# Patient Record
Sex: FEMALE | Race: WHITE | NOT HISPANIC OR LATINO | Employment: OTHER | URBAN - METROPOLITAN AREA
[De-identification: names, ages, dates, MRNs, and addresses within clinical notes are randomized per-mention and may not be internally consistent; named-entity substitution may affect disease eponyms.]

---

## 2017-03-21 ENCOUNTER — THERAPY VISIT (OUTPATIENT)
Dept: PHYSICAL THERAPY | Facility: CLINIC | Age: 77
End: 2017-03-21
Payer: MEDICARE

## 2017-03-21 DIAGNOSIS — M25.511 RIGHT SHOULDER PAIN, UNSPECIFIED CHRONICITY: Primary | ICD-10-CM

## 2017-03-21 PROCEDURE — 97161 PT EVAL LOW COMPLEX 20 MIN: CPT | Mod: GP | Performed by: PHYSICAL THERAPIST

## 2017-03-21 PROCEDURE — 97110 THERAPEUTIC EXERCISES: CPT | Mod: GP | Performed by: PHYSICAL THERAPIST

## 2017-03-21 PROCEDURE — G8984 CARRY CURRENT STATUS: HCPCS | Mod: GP | Performed by: PHYSICAL THERAPIST

## 2017-03-21 PROCEDURE — G8985 CARRY GOAL STATUS: HCPCS | Mod: GP | Performed by: PHYSICAL THERAPIST

## 2017-03-21 NOTE — MR AVS SNAPSHOT
"              After Visit Summary   3/21/2017    Kaycee Fuchs    MRN: [id number]           Patient Information     Date Of Birth          1940        Visit Information        Provider Department      3/21/2017 12:30 PM Trev Muñoz PT [place] [facility]        Today's Diagnoses     Right shoulder pain, unspecified chronicity    -  1       Follow-ups after your visit        Your next 10 appointments already scheduled     Mar 29, 2017 10:10 AM CDT   JENNIFER Extremity with Trev Muñoz PT   [place] [facility] ([place])    [address] [facility] [address]   [phone]              Who to contact     If you have questions or need follow up information about today's clinic visit or your schedule please contact [facility] directly at [phone].  Normal or non-critical lab and imaging results will be communicated to you by MyChart, letter or phone within 4 business days after the clinic has received the results. If you do not hear from us within 7 days, please contact the clinic through MyChart or phone. If you have a critical or abnormal lab result, we will notify you by phone as soon as possible.  Submit refill requests through [employer] or call your pharmacy and they will forward the refill request to us. Please allow 3 business days for your refill to be completed.          Additional Information About Your Visit        MyChart Information     [employer] lets you send messages to your doctor, view your test results, renew your prescriptions, schedule appointments and more. To sign up, go to [email or web address] . Click on \"Log in\" on the left side of the screen, which will take you to the Welcome page. Then click on \"Sign up Now\" on the right side of the page.     You will be asked to enter the access code listed below, as well as some personal information. Please follow the directions to create your username and " password.     Your access code is: [email or web address]  Expires: 2017  1:38 PM     Your access code will  in 90 days. If you need help or a new code, please call your [place] clinic or [phone].        Care EveryWhere ID     This is your Care EveryWhere ID. This could be used by other organizations to access your [place] medical records  [id number]         Blood Pressure from Last 3 Encounters:   06/06/10 130/70    Weight from Last 3 Encounters:   06/06/10 85.3 kg (188 lb)              We Performed the Following     HC PT EVAL, LOW COMPLEXITY     JENNIFER CERT REPORT     THERAPEUTIC EXERCISES        Primary Care Provider Office Phone # Fax #    Natalya NIKOLAY Matthews [phone]       [facility] [address] [facility] [address]  [facility] [address]        Thank you!     Thank you for choosing [place] [facility]  [facility]. Our goal is always to provide you with excellent care. Hearing back from our patients is one way we can continue to improve our services. Please take a few minutes to complete the written survey that you may receive in the mail after your visit with us. Thank you!             Your Updated Medication List - Protect others around you: Learn how to safely use, store and throw away your medicines at www.disposemymeds.org.          This list is accurate as of: 3/21/17  1:38 PM.  Always use your most recent med list.                   Brand Name Dispense Instructions for use    acetaminophen-isometheptene-dichloralphenazone 325- MG Caps     30 Cap    ONE TO TWO CAPSULES EVERY 4 HOURS AS NEEDED       ADVAIR DISKUS 100-50 MCG/DOSE diskus inhaler   Generic drug:  fluticasone-salmeterol      1 INHALATION EVERY 12 HOURS       FLONASE 50 MCG/ACT spray   Generic drug:  fluticasone      None Entered       naproxen 500 MG tablet    NAPROSYN    40 Tab    ONE TABLET TWICE DAILY WITH FOOD       OMEPRAZOLE      None Entered       ZYRTEC ALLERGY PO      None  Entered

## 2017-03-21 NOTE — PROGRESS NOTES
Subjective:    Kaycee Fuchs is a 76 year old female with a right shoulder condition.  Condition occurred with:  Unknown cause (R shoulder pain started Nov 2016 unknown injury, pt reports pain has progressed (in [place] 2 months pain was getting worse - used stackable washer/dryer was painful now back home and could not put luggage away due to pain. R shoulder injection last friday.).  Condition occurred: for unknown reasons.  This is a new condition  Nov 2016  .    Patient reports pain:  Posterior.    Pain is described as aching and is intermittent and reported as 5/10.  Associated symptoms:  Loss of motion/stiffness. Pain is worse during the night and worse during the day.  Symptoms are exacerbated by certain positions, using arm overhead, using arm at shoulder level, lifting and lying on extremity and relieved by ice.  Since onset symptoms are unchanged.  Special tests:  MRI.  Previous treatment: shoulder injection.  There was mild and no improvement following previous treatment.  General health as reported by patient is good.  Pertinent medical history includes:  Osteoarthritis, heart problems and asthma.  Medical allergies: no.  Other surgeries include:  Orthopedic surgery (2 COOPER, 1 TKA).  Current medications:  Heparin/coumadin.  Current occupation is Retired  .        Barriers include:  None as reported by the patient.    Red flags:  None as reported by the patient.                      Objective:    Standing Alignment:      Shoulder/UE:  Rounded shoulders                                       Shoulder Evaluation:  ROM:  AROM:    Flexion:  Left:  160    Right:  90    Abduction:  Left: 160   Right:  90      External Rotation:  Left:  70    Right:  70                PROM:  normal                                Strength:    Flexion: Right: 3/5     Pain:     Abduction:  Right: 3/5     Pain:    Internal Rotation:  Left:5-/5     Pain:    Right: 5-/5     Pain:  External Rotation:   Left:5-/5     Pain:   Right:4-/5       Pain:+                                                     General     ROS    Assessment/Plan:      Patient is a 76 year old female with right side shoulder complaints.    Patient has the following significant findings with corresponding treatment plan.                Diagnosis 1:  R shoulder pain  Pain -  hot/cold therapy, self management, education and home program  Decreased ROM/flexibility - manual therapy and therapeutic exercise  Decreased strength - therapeutic exercise and therapeutic activities  Inflammation - cold therapy  Decreased function - therapeutic activities  Impaired posture - neuro re-education    Therapy Evaluation Codes:   1) History comprised of:   Personal factors that impact the plan of care:      None.    Comorbidity factors that impact the plan of care are:      Weakness.     Medications impacting care: None.  2) Examination of Body Systems comprised of:   Body structures and functions that impact the plan of care:      Shoulder.   Activity limitations that impact the plan of care are:      Bathing, Cooking, Driving, Dressing, Lifting and Laying down.  3) Clinical presentation characteristics are:   Stable/Uncomplicated.  4) Decision-Making    Low complexity using standardized patient assessment instrument and/or measureable assessment of functional outcome.  Cumulative Therapy Evaluation is: Low complexity.    Previous and current functional limitations:  (See Goal Flow Sheet for this information)    Short term and Long term goals: (See Goal Flow Sheet for this information)     Communication ability:  Patient appears to be able to clearly communicate and understand verbal and written communication and follow directions correctly.  Treatment Explanation - The following has been discussed with the patient:   RX ordered/plan of care  Anticipated outcomes  Possible risks and side effects  This patient would benefit from PT intervention to resume normal activities.   Rehab potential is  excellent.    Frequency:  1 X week, once daily  Duration:  for 6 weeks  Discharge Plan:  Achieve all LTG.  Independent in home treatment program.  Reach maximal therapeutic benefit.    Please refer to the daily flowsheet for treatment today, total treatment time and time spent performing 1:1 timed codes.

## 2017-03-29 ENCOUNTER — THERAPY VISIT (OUTPATIENT)
Dept: PHYSICAL THERAPY | Facility: CLINIC | Age: 77
End: 2017-03-29
Payer: MEDICARE

## 2017-03-29 DIAGNOSIS — M25.511 RIGHT SHOULDER PAIN, UNSPECIFIED CHRONICITY: ICD-10-CM

## 2017-03-29 PROCEDURE — 97110 THERAPEUTIC EXERCISES: CPT | Mod: GP | Performed by: PHYSICAL THERAPIST

## 2017-04-03 DIAGNOSIS — I48.91 ATRIAL FIBRILLATION, UNSPECIFIED TYPE (H): ICD-10-CM

## 2017-04-03 LAB — INR PPP: 3.1 (ref 0.86–1.14)

## 2017-04-03 PROCEDURE — 36416 COLLJ CAPILLARY BLOOD SPEC: CPT | Performed by: FAMILY MEDICINE

## 2017-04-03 PROCEDURE — 85610 PROTHROMBIN TIME: CPT | Performed by: FAMILY MEDICINE

## 2017-04-05 ENCOUNTER — THERAPY VISIT (OUTPATIENT)
Dept: PHYSICAL THERAPY | Facility: CLINIC | Age: 77
End: 2017-04-05
Payer: MEDICARE

## 2017-04-05 DIAGNOSIS — M25.511 RIGHT SHOULDER PAIN, UNSPECIFIED CHRONICITY: ICD-10-CM

## 2017-04-05 PROCEDURE — 97110 THERAPEUTIC EXERCISES: CPT | Mod: GP | Performed by: PHYSICAL THERAPIST

## 2017-04-19 ENCOUNTER — THERAPY VISIT (OUTPATIENT)
Dept: PHYSICAL THERAPY | Facility: CLINIC | Age: 77
End: 2017-04-19
Payer: MEDICARE

## 2017-04-19 DIAGNOSIS — M25.511 RIGHT SHOULDER PAIN, UNSPECIFIED CHRONICITY: ICD-10-CM

## 2017-04-19 PROCEDURE — 97110 THERAPEUTIC EXERCISES: CPT | Mod: GP | Performed by: PHYSICAL THERAPIST

## 2017-05-01 DIAGNOSIS — I48.91 ATRIAL FIBRILLATION, UNSPECIFIED TYPE (H): ICD-10-CM

## 2017-05-01 LAB — INR PPP: 3.4 (ref 0.86–1.14)

## 2017-05-01 PROCEDURE — 36416 COLLJ CAPILLARY BLOOD SPEC: CPT | Performed by: FAMILY MEDICINE

## 2017-05-01 PROCEDURE — 85610 PROTHROMBIN TIME: CPT | Performed by: FAMILY MEDICINE

## 2017-05-08 DIAGNOSIS — I48.91 ATRIAL FIBRILLATION, UNSPECIFIED TYPE (H): ICD-10-CM

## 2017-05-08 LAB — INR PPP: 2.2 (ref 0.86–1.14)

## 2017-05-08 PROCEDURE — 36416 COLLJ CAPILLARY BLOOD SPEC: CPT | Performed by: FAMILY MEDICINE

## 2017-05-08 PROCEDURE — 85610 PROTHROMBIN TIME: CPT | Performed by: FAMILY MEDICINE

## 2017-06-07 ENCOUNTER — THERAPY VISIT (OUTPATIENT)
Dept: PHYSICAL THERAPY | Facility: CLINIC | Age: 77
End: 2017-06-07
Payer: MEDICARE

## 2017-06-07 DIAGNOSIS — M25.511 RIGHT SHOULDER PAIN, UNSPECIFIED CHRONICITY: ICD-10-CM

## 2017-06-07 PROCEDURE — 97112 NEUROMUSCULAR REEDUCATION: CPT | Mod: GP | Performed by: PHYSICAL THERAPIST

## 2017-06-07 PROCEDURE — G8985 CARRY GOAL STATUS: HCPCS | Mod: GP | Performed by: PHYSICAL THERAPIST

## 2017-06-07 PROCEDURE — 97110 THERAPEUTIC EXERCISES: CPT | Mod: GP | Performed by: PHYSICAL THERAPIST

## 2017-06-07 PROCEDURE — G8986 CARRY D/C STATUS: HCPCS | Mod: GP | Performed by: PHYSICAL THERAPIST

## 2017-06-07 NOTE — MR AVS SNAPSHOT
"              After Visit Summary   2017    Kaycee Fuhcs    MRN: [id number]           Patient Information     Date Of Birth          1940        Visit Information        Provider Department      2017 10:10 AM Trev Muñoz PT [place] [facility]        Today's Diagnoses     Right shoulder pain, unspecified chronicity           Follow-ups after your visit        Who to contact     If you have questions or need follow up information about today's clinic visit or your schedule please contact [facility] directly at [phone].  Normal or non-critical lab and imaging results will be communicated to you by [employer], letter or phone within 4 business days after the clinic has received the results. If you do not hear from us within 7 days, please contact the clinic through [employer] or phone. If you have a critical or abnormal lab result, we will notify you by phone as soon as possible.  Submit refill requests through [employer] or call your pharmacy and they will forward the refill request to us. Please allow 3 business days for your refill to be completed.          Additional Information About Your Visit        MyChart Information     [employer] lets you send messages to your doctor, view your test results, renew your prescriptions, schedule appointments and more. To sign up, go to [email or web address] . Click on \"Log in\" on the left side of the screen, which will take you to the Welcome page. Then click on \"Sign up Now\" on the right side of the page.     You will be asked to enter the access code listed below, as well as some personal information. Please follow the directions to create your username and password.     Your access code is: [email or web address]  Expires: 2017  1:38 PM     Your access code will  in 90 days. If you need help or a new code, please call your [place] clinic or [phone].        Care EveryWhere ID     This is your Care " EveryWhere ID. This could be used by other organizations to access your [place] medical records  [id number]         Blood Pressure from Last 3 Encounters:   06/06/10 130/70    Weight from Last 3 Encounters:   06/06/10 85.3 kg (188 lb)              We Performed the Following     JENNIFER PROGRESS NOTES REPORT     NEUROMUSCULAR RE-EDUCATION     THERAPEUTIC EXERCISES        Primary Care Provider Office Phone # Fax #    Natalya Matthews [phone]       [facility] [address]  [facility] [address]        Thank you!     Thank you for choosing [place] [facility]  [facility]. Our goal is always to provide you with excellent care. Hearing back from our patients is one way we can continue to improve our services. Please take a few minutes to complete the written survey that you may receive in the mail after your visit with us. Thank you!             Your Updated Medication List - Protect others around you: Learn how to safely use, store and throw away your medicines at www.disposemymeds.org.          This list is accurate as of: 6/7/17 11:25 AM.  Always use your most recent med list.                   Brand Name Dispense Instructions for use    acetaminophen-isometheptene-dichloralphenazone 325- MG Caps     30 Cap    ONE TO TWO CAPSULES EVERY 4 HOURS AS NEEDED       ADVAIR DISKUS 100-50 MCG/DOSE diskus inhaler   Generic drug:  fluticasone-salmeterol      1 INHALATION EVERY 12 HOURS       FLONASE 50 MCG/ACT spray   Generic drug:  fluticasone      None Entered       naproxen 500 MG tablet    NAPROSYN    40 Tab    ONE TABLET TWICE DAILY WITH FOOD       OMEPRAZOLE      None Entered       ZYRTEC ALLERGY PO      None Entered

## 2017-06-07 NOTE — LETTER
[facility]  [address]  [facility] [address]  [phone]    2017    Re: Kaycee Fuchs   :   1940  MRN:  [id number]   REFERRING PHYSICIAN:   Sky Willett  [facility]  Date of Initial Evaluation:  3/21/2017  Visits:  Rxs Used: 5  Reason for Referral:  Right shoulder pain, unspecified chronicity    DISCHARGE REPORT    SUBJECTIVE  Subjective changes noted by patient:  Kaycee returns feeling continued improvement with shoulder, using is more normally.  Light yard work and plantings this week felt sore.  HEP is helping.   Changes in function:  Yes (See Goal flowsheet attached for changes in current functional level)  Adverse reaction to treatment or activity: None  OBJECTIVE  Changes noted in objective findings:  Yes, Objective: AROM WNL slight pain with Abduction, MMT ER 5-/5, IR 5-/5 sore, Flex 5/5, Abd 5-/5 no pain  ASSESSMENT/PLAN  Updated problem list and treatment plan: Diagnosis 1:  R shoulder strain  Pain -  hot/cold therapy, self management, education and home program  Decreased strength - therapeutic exercise and therapeutic activities  Decreased function - therapeutic activities  Impaired posture - neuro re-education  STG/LTGs have been met or progress has been made towards goals:  Yes (See Goal flow sheet completed today.)  Assessment of Progress: The patient's condition is improving.  Self Management Plans:  Patient has been instructed in a home treatment program.  Patient  has been instructed in self management of symptoms.  Patient is independent in self management of symptoms.  I have re-evaluated this patient and find that the nature, scope, duration and intensity of the therapy is appropriate for the medical condition of the patient.  Kaycee continues to require the following intervention to meet STG and LTG's:  PT intervention is no longer required to meet STG/LTG.  Recommendations:  This patient is ready to be  discharged from therapy and continue their home treatment program.    Thank you for your referral.    INQUIRIES  Therapist: David Muñoz PT  [place] [facility]  [address]  [place] [address]  Phone: [phone]  Fax: [phone]

## 2017-06-07 NOTE — PROGRESS NOTES
Subjective:    HPI                    Objective:    System    Physical Exam    General     ROS    Assessment/Plan:      DISCHARGE REPORT    SUBJECTIVE  Subjective changes noted by patient:  Kaycee returns feeling continued improvement with shoulder, using is more normally.  Light yard work and plantings this week felt sore.  HEP is helping.   Changes in function:  Yes (See Goal flowsheet attached for changes in current functional level)  Adverse reaction to treatment or activity: None    OBJECTIVE  Changes noted in objective findings:  Yes, Objective: AROM WNL slight pain with Abduction, MMT ER 5-/5, IR 5-/5 sore, Flex 5/5, Abd 5-/5 no pain    ASSESSMENT/PLAN  Updated problem list and treatment plan: Diagnosis 1:  R shoulder strain  Pain -  hot/cold therapy, self management, education and home program  Decreased strength - therapeutic exercise and therapeutic activities  Decreased function - therapeutic activities  Impaired posture - neuro re-education  STG/LTGs have been met or progress has been made towards goals:  Yes (See Goal flow sheet completed today.)  Assessment of Progress: The patient's condition is improving.  Self Management Plans:  Patient has been instructed in a home treatment program.  Patient  has been instructed in self management of symptoms.  Patient is independent in self management of symptoms.  I have re-evaluated this patient and find that the nature, scope, duration and intensity of the therapy is appropriate for the medical condition of the patient.  Kaycee continues to require the following intervention to meet STG and LTG's:  PT intervention is no longer required to meet STG/LTG.    Recommendations:  This patient is ready to be discharged from therapy and continue their home treatment program.    Please refer to the daily flowsheet for treatment today, total treatment time and time spent performing 1:1 timed codes.

## 2017-08-11 DIAGNOSIS — I48.91 ATRIAL FIBRILLATION, UNSPECIFIED TYPE (H): ICD-10-CM

## 2017-08-11 LAB — INR PPP: 1.5 (ref 0.86–1.14)

## 2017-08-11 PROCEDURE — 36416 COLLJ CAPILLARY BLOOD SPEC: CPT | Performed by: FAMILY MEDICINE

## 2017-08-11 PROCEDURE — 85610 PROTHROMBIN TIME: CPT | Performed by: FAMILY MEDICINE

## 2017-08-18 DIAGNOSIS — I48.91 ATRIAL FIBRILLATION (H): Primary | ICD-10-CM

## 2017-08-18 DIAGNOSIS — I48.91 ATRIAL FIBRILLATION, UNSPECIFIED TYPE (H): ICD-10-CM

## 2017-08-18 LAB — INR PPP: 3.7 (ref 0.86–1.14)

## 2017-08-18 PROCEDURE — 85610 PROTHROMBIN TIME: CPT | Performed by: FAMILY MEDICINE

## 2017-08-18 PROCEDURE — 36416 COLLJ CAPILLARY BLOOD SPEC: CPT | Performed by: FAMILY MEDICINE

## 2017-09-20 DIAGNOSIS — I48.91 ATRIAL FIBRILLATION (H): ICD-10-CM

## 2017-09-20 LAB — INR PPP: 3.3 (ref 0.86–1.14)

## 2017-09-20 PROCEDURE — 85610 PROTHROMBIN TIME: CPT | Performed by: FAMILY MEDICINE

## 2017-09-20 PROCEDURE — 36416 COLLJ CAPILLARY BLOOD SPEC: CPT | Performed by: FAMILY MEDICINE

## 2017-10-05 DIAGNOSIS — I48.91 ATRIAL FIBRILLATION (H): ICD-10-CM

## 2017-10-05 LAB — INR PPP: 1.6 (ref 0.86–1.14)

## 2017-10-05 PROCEDURE — 36415 COLL VENOUS BLD VENIPUNCTURE: CPT | Performed by: FAMILY MEDICINE

## 2017-10-05 PROCEDURE — 85610 PROTHROMBIN TIME: CPT | Performed by: FAMILY MEDICINE

## 2018-03-22 DIAGNOSIS — I48.91 ATRIAL FIBRILLATION (H): ICD-10-CM

## 2018-03-22 LAB — INR BLD: 2.2 (ref 0.86–1.14)

## 2018-03-22 PROCEDURE — 36416 COLLJ CAPILLARY BLOOD SPEC: CPT | Performed by: FAMILY MEDICINE

## 2018-03-22 PROCEDURE — 85610 PROTHROMBIN TIME: CPT | Mod: QW | Performed by: FAMILY MEDICINE

## 2018-04-02 DIAGNOSIS — I48.91 ATRIAL FIBRILLATION (H): ICD-10-CM

## 2018-04-02 LAB — INR BLD: 2.6 (ref 0.86–1.14)

## 2018-04-02 PROCEDURE — 36416 COLLJ CAPILLARY BLOOD SPEC: CPT | Performed by: FAMILY MEDICINE

## 2018-04-02 PROCEDURE — 85610 PROTHROMBIN TIME: CPT | Mod: QW | Performed by: FAMILY MEDICINE

## 2018-06-07 DIAGNOSIS — I48.91 ATRIAL FIBRILLATION (H): ICD-10-CM

## 2018-06-07 LAB — INR BLD: 3 (ref 0.86–1.14)

## 2018-06-07 PROCEDURE — 85610 PROTHROMBIN TIME: CPT | Mod: QW | Performed by: FAMILY MEDICINE

## 2018-06-07 PROCEDURE — 36416 COLLJ CAPILLARY BLOOD SPEC: CPT | Performed by: FAMILY MEDICINE

## 2018-06-29 ENCOUNTER — APPOINTMENT (OUTPATIENT)
Dept: VASCULAR SURGERY | Facility: CLINIC | Age: 78
End: 2018-06-29
Payer: COMMERCIAL

## 2018-06-29 PROCEDURE — 99207 ZZC VEINSOLUTIONS FREE SCREENING: CPT | Performed by: SURGERY

## 2018-08-21 ENCOUNTER — TRANSFERRED RECORDS (OUTPATIENT)
Dept: HEALTH INFORMATION MANAGEMENT | Facility: CLINIC | Age: 78
End: 2018-08-21

## 2018-08-21 DIAGNOSIS — I48.91 ATRIAL FIBRILLATION (H): ICD-10-CM

## 2018-08-21 LAB — INR BLD: 2.4 (ref 0.86–1.14)

## 2018-08-21 PROCEDURE — 85610 PROTHROMBIN TIME: CPT | Mod: QW

## 2018-08-21 PROCEDURE — 36416 COLLJ CAPILLARY BLOOD SPEC: CPT

## 2018-08-22 DIAGNOSIS — I48.91 ATRIAL FIBRILLATION (H): Primary | ICD-10-CM

## 2018-09-05 DIAGNOSIS — I48.91 ATRIAL FIBRILLATION (H): ICD-10-CM

## 2018-09-05 LAB — INR BLD: 3.3 (ref 0.86–1.14)

## 2018-09-05 PROCEDURE — 85610 PROTHROMBIN TIME: CPT | Mod: QW

## 2018-09-05 PROCEDURE — 36416 COLLJ CAPILLARY BLOOD SPEC: CPT

## 2018-10-25 DIAGNOSIS — I48.91 ATRIAL FIBRILLATION (H): ICD-10-CM

## 2018-10-25 LAB — INR BLD: 3.2 (ref 0.86–1.14)

## 2018-10-25 PROCEDURE — 85610 PROTHROMBIN TIME: CPT | Mod: QW

## 2018-10-25 PROCEDURE — 36416 COLLJ CAPILLARY BLOOD SPEC: CPT

## 2018-11-20 DIAGNOSIS — I48.91 ATRIAL FIBRILLATION (H): ICD-10-CM

## 2018-11-20 LAB — INR BLD: 3 (ref 0.86–1.14)

## 2018-11-20 PROCEDURE — 85610 PROTHROMBIN TIME: CPT | Mod: QW

## 2018-11-20 PROCEDURE — 36416 COLLJ CAPILLARY BLOOD SPEC: CPT

## 2019-07-03 DIAGNOSIS — I48.91 ATRIAL FIBRILLATION (H): ICD-10-CM

## 2019-07-03 LAB — INR BLD: 1.6 (ref 0.86–1.14)

## 2019-07-03 PROCEDURE — 36416 COLLJ CAPILLARY BLOOD SPEC: CPT

## 2019-07-03 PROCEDURE — 85610 PROTHROMBIN TIME: CPT | Mod: QW

## 2019-09-09 ENCOUNTER — THERAPY VISIT (OUTPATIENT)
Dept: PHYSICAL THERAPY | Facility: CLINIC | Age: 79
End: 2019-09-09
Payer: MEDICARE

## 2019-09-09 DIAGNOSIS — G89.29 CHRONIC RIGHT SHOULDER PAIN: ICD-10-CM

## 2019-09-09 DIAGNOSIS — M25.511 CHRONIC RIGHT SHOULDER PAIN: ICD-10-CM

## 2019-09-09 DIAGNOSIS — I48.20 CHRONIC ATRIAL FIBRILLATION (H): ICD-10-CM

## 2019-09-09 DIAGNOSIS — M25.562 CHRONIC PAIN OF LEFT KNEE: ICD-10-CM

## 2019-09-09 DIAGNOSIS — G89.29 CHRONIC PAIN OF LEFT KNEE: ICD-10-CM

## 2019-09-09 DIAGNOSIS — M25.511 RIGHT SHOULDER PAIN, UNSPECIFIED CHRONICITY: ICD-10-CM

## 2019-09-09 LAB — INR BLD: 3 (ref 0.86–1.14)

## 2019-09-09 PROCEDURE — 36416 COLLJ CAPILLARY BLOOD SPEC: CPT | Performed by: INTERNAL MEDICINE

## 2019-09-09 PROCEDURE — 85610 PROTHROMBIN TIME: CPT | Mod: QW | Performed by: INTERNAL MEDICINE

## 2019-09-09 PROCEDURE — 97161 PT EVAL LOW COMPLEX 20 MIN: CPT | Mod: GP | Performed by: PHYSICAL THERAPIST

## 2019-09-09 PROCEDURE — 97110 THERAPEUTIC EXERCISES: CPT | Mod: GP | Performed by: PHYSICAL THERAPIST

## 2019-09-09 NOTE — LETTER
DEPARTMENT OF HEALTH AND HUMAN SERVICES  CENTERS FOR MEDICARE & MEDICAID SERVICES    PLAN/UPDATED PLAN OF PROGRESS FOR OUTPATIENT REHABILITATION    PATIENTS NAME:  Kaycee Fuchs   : 1940  PROVIDER NUMBER:    [id number]  HICN:    [id number]  PROVIDER NAME: [facility]  MEDICAL RECORD NUMBER: [id number]   START OF CARE DATE:  SOC Date: 19   TYPE:  PT  PRIMARY/TREATMENT DIAGNOSIS: Right shoulder pain, unspecified chronicity  Chronic right shoulder pain, Chronic pain of left knee  VISITS FROM START OF CARE:  Rxs Used: 1     [place] [facility] Initial Evaluation  Subjective:  The history is provided by the patient. No  was used.   Kaycee Fuchs being seen for L knee pain and R shoulder pain -.   Problem began 2019. Where condition occurred: for unknown reasons.Problem occurred: over the past few months feeling progressive L knee pain and R shoulder pain.  Pain score: knee 0/10 at rest, 7/10,  R shoulder pain at worst 8/10. General health as reported by patient is good. Pertinent medical history includes:  Osteoarthritis.    Surgeries include:  Orthopedic surgery. Other surgery history details: R TKA.  Current medications:  Pain medication (see EPIC).     Pain is described as aching and is intermittent. Pain is worse during the day.  Special tests:  X-ray and MRI. Previous treatment includes physical therapy. There was moderate improvement following previous treatment.   Patient is retired.   Barriers include:  None as reported by patient.  Red flags:  None as reported by patient.  Type of problem:  Right shoulder   Condition occurred with:  Other (Pt with co R shoulder pain, weakness that has progressed over the past few months.  Pt with RCT 2 years ago without surgery, she did PT then with a good recovery.  L knee pain also with activity, pain posterior). This is a recurrent condition    Patient reports pain:  Lateral and posterior.   Associated symptoms:  Loss of motion/stiffness and loss of strength. Symptoms are exacerbated by carrying, lifting, certain positions, using arm at shoulder level and lying on extremity and relieved by ice.    Type of problem:  Left knee        Patient reports pain:  Posterior.   Symptoms are exacerbated by bending/squatting, standing, weight bearing, descending stairs and ascending stairs and relieved by ice, analgesics and bracing/immobilizing.            Objective:  Gait:    Gait Type:  Antalgic   Weight Bearing Status:  WBAT   Assistive Devices:  None  Shoulder Evaluation:  ROM:  AROM:  normal  Pain: R shoulder pain with Flex, Abd, scaption    PATIENTS NAME:  Kaycee Fuchs   : 1940    Strength:    Flexion: Left:5/5   Pain:    Right: 4/5      Pain:  +  Abduction:  Left: 5/5  Pain:    Right: 4-/5      Pain:+  Internal Rotation:  Left:5/5     Pain:    Right: 5/5     Pain:  External Rotation:   Left:5/5     Pain:   Right:4+/5     Pain:    Knee Evaluation:  ROM:    AROM  Hyperextension:  Left:  0    Right: 0  Extension:  Left: 0    Right:  0  Flexion: Left: 116    Right: 126  Strength:   Quad Set Left: Fair    Pain:   Quad Set Right: Good    Pain:  Ligament Testing:  Normal  Special Tests:   Left knee positive for the following special tests:  Asterisk Sign  Left knee negative for the following special tests:  Patellar Tracking-Abduction Medial and Patellar Tracking-Abduction Lateral  Edema:  Edema of the knee: + posterior tenderness and swelling (Bakers cyst)    Assessment/Plan:    Patient is a 78 year old female with right side shoulder and left side knee complaints.    Patient has the following significant findings with corresponding treatment plan.                Diagnosis 1:  R shoulder pain, L knee pain  Pain -  hot/cold therapy  Decreased ROM/flexibility - manual therapy and therapeutic exercise  Decreased strength - therapeutic exercise and therapeutic activities  Inflammation - cold  "therapy  Impaired gait - gait training  Decreased function - therapeutic activities  Impaired posture - neuro re-education    Previous and current functional limitations:  (See Goal Flow Sheet for this information)    Short term and Long term goals: (See Goal Flow Sheet for this information)     Communication ability:  Patient appears to be able to clearly communicate and understand verbal and written communication and follow directions correctly.  Treatment Explanation - The following has been discussed with the patient:   RX ordered/plan of care  Anticipated outcomes  Possible risks and side effects  This patient would benefit from PT intervention to resume normal activities.   Rehab potential is good.    Frequency:  1 X week, once daily  Duration:  for 8 weeks  Discharge Plan:  Achieve all LTG.  Independent in home treatment program.  Reach maximal therapeutic benefit.      PATIENTS NAME:  Kaycee Fuchs   : 1940    Caregiver Signature/Credentials _____________________________ Date ________       Treating Provider: David Muñoz PT     I have reviewed and certified the need for these services and plan of treatment while under my care.      PHYSICIAN'S SIGNATURE:   _____________________________________  Date___________                         Sky Willett MD    Certification period:  Beginning of Cert date period: 19 to  End of Cert period date: 19     Functional Level Progress Report: Please see attached \"Goal Flow sheet for Functional level.\"    ____X____ Continue Services or       ________ DC Services                Service dates: From  SOC Date: 19 date to present                         "

## 2019-09-09 NOTE — PROGRESS NOTES
[facility] Initial Evaluation  Subjective:  The history is provided by the patient. No  was used.   Kaycee Fuchs being seen for L knee pain and R shoulder pain -.   Problem began 6/9/2019. Where condition occurred: for unknown reasons.Problem occurred: over the past few months feeling progressive L knee pain and R shoulder pain.  Pain score: knee 0/10 at rest, 7/10,  R shoulder pain at worst 8/10. General health as reported by patient is good. Pertinent medical history includes:  Osteoarthritis.    Surgeries include:  Orthopedic surgery. Other surgery history details: R TKA.  Current medications:  Pain medication (see EPIC).     Pain is described as aching and is intermittent. Pain is worse during the day.  Special tests:  X-ray and MRI. Previous treatment includes physical therapy. There was moderate improvement following previous treatment.   Patient is retired.   Barriers include:  None as reported by patient.  Red flags:  None as reported by patient.  Type of problem:  Right shoulder   Condition occurred with:  Other (Pt with co R shoulder pain, weakness that has progressed over the past few months.  Pt with RCT 2 years ago without surgery, she did PT then with a good recovery.  L knee pain also with activity, pain posterior). This is a recurrent condition    Patient reports pain:  Lateral and posterior.  Associated symptoms:  Loss of motion/stiffness and loss of strength. Symptoms are exacerbated by carrying, lifting, certain positions, using arm at shoulder level and lying on extremity and relieved by ice.    Type of problem:  Left knee        Patient reports pain:  Posterior.   Symptoms are exacerbated by bending/squatting, standing, weight bearing, descending stairs and ascending stairs and relieved by ice, analgesics and bracing/immobilizing.                      Objective:    Gait:    Gait Type:  Antalgic   Weight Bearing Status:  WBAT   Assistive Devices:   None                           Shoulder Evaluation:  ROM:  AROM:  normal                              Pain: R shoulder pain with Flex, Abd, scaption    Strength:    Flexion: Left:5/5   Pain:    Right: 4/5      Pain:  +    Abduction:  Left: 5/5  Pain:    Right: 4-/5      Pain:+    Internal Rotation:  Left:5/5     Pain:    Right: 5/5     Pain:  External Rotation:   Left:5/5     Pain:   Right:4+/5     Pain:                                               Knee Evaluation:  ROM:    AROM    Hyperextension:  Left:  0    Right: 0  Extension:  Left: 0    Right:  0  Flexion: Left: 116    Right: 126        Strength:         Quad Set Left: Fair    Pain:   Quad Set Right: Good    Pain:  Ligament Testing:  Normal                Special Tests:   Left knee positive for the following special tests:  Asterisk Sign  Left knee negative for the following special tests:  Patellar Tracking-Abduction Medial and Patellar Tracking-Abduction Lateral      Edema:  Edema of the knee: + posterior tenderness and swelling (Bakers cyst)            General     ROS    Assessment/Plan:    Patient is a 78 year old female with right side shoulder and left side knee complaints.    Patient has the following significant findings with corresponding treatment plan.                Diagnosis 1:  R shoulder pain, L knee pain  Pain -  hot/cold therapy  Decreased ROM/flexibility - manual therapy and therapeutic exercise  Decreased strength - therapeutic exercise and therapeutic activities  Inflammation - cold therapy  Impaired gait - gait training  Decreased function - therapeutic activities  Impaired posture - neuro re-education    Previous and current functional limitations:  (See Goal Flow Sheet for this information)    Short term and Long term goals: (See Goal Flow Sheet for this information)     Communication ability:  Patient appears to be able to clearly communicate and understand verbal and written communication and follow directions correctly.  Treatment  Explanation - The following has been discussed with the patient:   RX ordered/plan of care  Anticipated outcomes  Possible risks and side effects  This patient would benefit from PT intervention to resume normal activities.   Rehab potential is good.    Frequency:  1 X week, once daily  Duration:  for 8 weeks  Discharge Plan:  Achieve all LTG.  Independent in home treatment program.  Reach maximal therapeutic benefit.    Please refer to the daily flowsheet for treatment today, total treatment time and time spent performing 1:1 timed codes.

## 2019-09-16 ENCOUNTER — THERAPY VISIT (OUTPATIENT)
Dept: PHYSICAL THERAPY | Facility: CLINIC | Age: 79
End: 2019-09-16
Payer: MEDICARE

## 2019-09-16 DIAGNOSIS — M25.511 CHRONIC RIGHT SHOULDER PAIN: ICD-10-CM

## 2019-09-16 DIAGNOSIS — G89.29 CHRONIC PAIN OF LEFT KNEE: ICD-10-CM

## 2019-09-16 DIAGNOSIS — M25.562 CHRONIC PAIN OF LEFT KNEE: ICD-10-CM

## 2019-09-16 DIAGNOSIS — G89.29 CHRONIC RIGHT SHOULDER PAIN: ICD-10-CM

## 2019-09-16 PROCEDURE — 97110 THERAPEUTIC EXERCISES: CPT | Mod: GP | Performed by: PHYSICAL THERAPIST

## 2019-09-16 PROCEDURE — 97530 THERAPEUTIC ACTIVITIES: CPT | Mod: GP | Performed by: PHYSICAL THERAPIST

## 2019-09-23 ENCOUNTER — THERAPY VISIT (OUTPATIENT)
Dept: PHYSICAL THERAPY | Facility: CLINIC | Age: 79
End: 2019-09-23
Payer: MEDICARE

## 2019-09-23 DIAGNOSIS — G89.29 CHRONIC RIGHT SHOULDER PAIN: ICD-10-CM

## 2019-09-23 DIAGNOSIS — M25.511 CHRONIC RIGHT SHOULDER PAIN: ICD-10-CM

## 2019-09-23 DIAGNOSIS — G89.29 CHRONIC PAIN OF LEFT KNEE: ICD-10-CM

## 2019-09-23 DIAGNOSIS — M25.562 CHRONIC PAIN OF LEFT KNEE: ICD-10-CM

## 2019-09-23 PROCEDURE — 97110 THERAPEUTIC EXERCISES: CPT | Mod: GP | Performed by: PHYSICAL THERAPIST

## 2019-09-30 ENCOUNTER — THERAPY VISIT (OUTPATIENT)
Dept: PHYSICAL THERAPY | Facility: CLINIC | Age: 79
End: 2019-09-30
Payer: MEDICARE

## 2019-09-30 DIAGNOSIS — G89.29 CHRONIC PAIN OF LEFT KNEE: ICD-10-CM

## 2019-09-30 DIAGNOSIS — G89.29 CHRONIC RIGHT SHOULDER PAIN: ICD-10-CM

## 2019-09-30 DIAGNOSIS — M25.511 CHRONIC RIGHT SHOULDER PAIN: ICD-10-CM

## 2019-09-30 DIAGNOSIS — M25.562 CHRONIC PAIN OF LEFT KNEE: ICD-10-CM

## 2019-09-30 PROCEDURE — 97530 THERAPEUTIC ACTIVITIES: CPT | Mod: GP | Performed by: PHYSICAL THERAPIST

## 2019-09-30 PROCEDURE — 97110 THERAPEUTIC EXERCISES: CPT | Mod: GP | Performed by: PHYSICAL THERAPIST

## 2019-10-07 ENCOUNTER — THERAPY VISIT (OUTPATIENT)
Dept: PHYSICAL THERAPY | Facility: CLINIC | Age: 79
End: 2019-10-07
Payer: MEDICARE

## 2019-10-07 DIAGNOSIS — G89.29 CHRONIC RIGHT SHOULDER PAIN: ICD-10-CM

## 2019-10-07 DIAGNOSIS — G89.29 CHRONIC PAIN OF LEFT KNEE: ICD-10-CM

## 2019-10-07 DIAGNOSIS — M25.562 CHRONIC PAIN OF LEFT KNEE: ICD-10-CM

## 2019-10-07 DIAGNOSIS — M25.511 CHRONIC RIGHT SHOULDER PAIN: ICD-10-CM

## 2019-10-07 PROCEDURE — 97112 NEUROMUSCULAR REEDUCATION: CPT | Mod: GP | Performed by: PHYSICAL THERAPIST

## 2019-10-07 PROCEDURE — 97110 THERAPEUTIC EXERCISES: CPT | Mod: GP | Performed by: PHYSICAL THERAPIST

## 2019-10-21 ENCOUNTER — THERAPY VISIT (OUTPATIENT)
Dept: PHYSICAL THERAPY | Facility: CLINIC | Age: 79
End: 2019-10-21
Payer: MEDICARE

## 2019-10-21 DIAGNOSIS — G89.29 CHRONIC RIGHT SHOULDER PAIN: ICD-10-CM

## 2019-10-21 DIAGNOSIS — I48.20 CHRONIC ATRIAL FIBRILLATION (H): ICD-10-CM

## 2019-10-21 DIAGNOSIS — M25.511 CHRONIC RIGHT SHOULDER PAIN: ICD-10-CM

## 2019-10-21 DIAGNOSIS — G89.29 CHRONIC PAIN OF LEFT KNEE: ICD-10-CM

## 2019-10-21 DIAGNOSIS — M25.562 CHRONIC PAIN OF LEFT KNEE: ICD-10-CM

## 2019-10-21 LAB — INR BLD: 4.1 (ref 0.86–1.14)

## 2019-10-21 PROCEDURE — 36416 COLLJ CAPILLARY BLOOD SPEC: CPT | Performed by: INTERNAL MEDICINE

## 2019-10-21 PROCEDURE — 97110 THERAPEUTIC EXERCISES: CPT | Mod: GP | Performed by: PHYSICAL THERAPIST

## 2019-10-21 PROCEDURE — 85610 PROTHROMBIN TIME: CPT | Mod: QW | Performed by: INTERNAL MEDICINE

## 2019-10-21 NOTE — PROGRESS NOTES
Subjective:  HPI                    Objective:  System    Physical Exam    General     ROS    Assessment/Plan:    PROGRESS  REPORT    Progress reporting period is from initial to 10/21/19.       SUBJECTIVE  Subjective changes noted by patient:  Kaycee returns to PT with having increased knee pain last week, went to walk in urgent care last Friday and got a knee injection.  Continued pain and limits with walking and stairs over the weekend.  Pt on Sunday felt her L knee pain again while she was walking down some [employer] steps while holding onto caserol.  Kaycee does report her shoulder is feeling ok/better.   Changes in function:  Yes, shoulder reaching better, no change with knee pain/function  Adverse reaction to treatment or activity: None    OBJECTIVE  Changes noted in objective findings:  Yes, Shoulder AROM/strength gaining.   Knee pain w L knee flexion 0-0-115.  quad set fair, SLR fair control slight limited with TKE control, gait with pain during WB     ASSESSMENT/PLAN  Updated problem list and treatment plan: Diagnosis 1:  R shoulder, L knee OA  Pain -  hot/cold therapy  Decreased ROM/flexibility - manual therapy and therapeutic exercise  Decreased joint mobility - manual therapy and therapeutic exercise  Decreased strength - therapeutic exercise and therapeutic activities  Impaired balance - neuro re-education and therapeutic activities  Decreased proprioception - neuro re-education and therapeutic activities  Inflammation - cold therapy  Impaired gait - gait training  Decreased function - therapeutic activities  STG/LTGs have been met or progress has been made towards goals:  Yes, shoulder improved, no change with knee  Assessment of Progress: The patient is no longer making progress in all 3 of the following areas: subjectively, objectively and functionally.  Self Management Plans:  Patient has been instructed in a home treatment program.  Patient  has been instructed in self management of symptoms.  I have  re-evaluated this patient and find that the nature, scope, duration and intensity of the therapy is appropriate for the medical condition of the patient.  Kaycee continues to require the following intervention to meet STG and LTG's:  PT intervention is no longer required to meet STG/LTG.    Recommendations:  This patient would benefit from further evaluation with her Knee MD regarding further recommendations, Kaycee leaves for [place] Jan 7th for 3 months and if a TKA is recommended would most likely need to plan for this soon.    Please refer to the daily flowsheet for treatment today, total treatment time and time spent performing 1:1 timed codes.

## 2019-10-21 NOTE — LETTER
[facility]  [address]  [facility] [address]  [phone]    2019    Re: Kaycee Fuchs   :   1940  MRN:  [id number]   REFERRING PHYSICIAN:   Sky Willett    [facility]  Date of Initial Evaluation:  2019  Visits:  Rxs Used: 6  Reason for Referral:   Chronic right shoulder pain, Chronic pain of left knee    PROGRESS  REPORT  Progress reporting period is from initial to 10/21/19.       SUBJECTIVE  Subjective changes noted by patient:  Kaycee returns to PT with having increased knee pain last week, went to walk in urgent care last Friday and got a knee injection.  Continued pain and limits with walking and stairs over the weekend.  Pt on  felt her L knee pain again while she was walking down some [employer] steps while holding onto caserol.  Kaycee does report her shoulder is feeling ok/better.   Changes in function:  Yes, shoulder reaching better, no change with knee pain/function  Adverse reaction to treatment or activity: None    OBJECTIVE  Changes noted in objective findings:  Yes, Shoulder AROM/strength gaining.   Knee pain w L knee flexion 0-0-115.  quad set fair, SLR fair control slight limited with TKE control, gait with pain during WB     ASSESSMENT/PLAN  Updated problem list and treatment plan: Diagnosis 1:  R shoulder, L knee OA  Pain -  hot/cold therapy  Decreased ROM/flexibility - manual therapy and therapeutic exercise  Decreased joint mobility - manual therapy and therapeutic exercise  Decreased strength - therapeutic exercise and therapeutic activities  Impaired balance - neuro re-education and therapeutic activities  Decreased proprioception - neuro re-education and therapeutic activities  Inflammation - cold therapy  Impaired gait - gait training  Decreased function - therapeutic activities  STG/LTGs have been met or progress has been made towards goals:  Yes, shoulder improved, no change with  knee  Assessment of Progress: The patient is no longer making progress in all 3 of the following areas: subjectively, objectively and functionally.  Self Management Plans:  Patient has been instructed in a home treatment program.  Patient  has been instructed in self management of symptoms.  I have re-evaluated this patient and find that the nature, scope, duration and intensity of the therapy is appropriate for the medical condition of the patient.  Kaycee continues to require the following intervention to meet STG and LTG's:  PT   Re: Kaycee Fuchs   :   1940    intervention is no longer required to meet STG/LTG.    Recommendations:  This patient would benefit from further evaluation with her Knee MD regarding further recommendations, Kaycee leaves for [place]  for 3 months and if a TKA is recommended would most likely need to plan for this soon.    Thank you for your referral.    INQUIRIES  Therapist: Trev Muñoz PT  [facility]  [address] [address]  Phone: [phone]  Fax: [phone]

## 2019-10-30 DIAGNOSIS — I48.20 CHRONIC ATRIAL FIBRILLATION (H): ICD-10-CM

## 2019-10-30 LAB — INR BLD: 2 (ref 0.86–1.14)

## 2019-10-30 PROCEDURE — 85610 PROTHROMBIN TIME: CPT | Mod: QW | Performed by: INTERNAL MEDICINE

## 2019-10-30 PROCEDURE — 36416 COLLJ CAPILLARY BLOOD SPEC: CPT | Performed by: INTERNAL MEDICINE

## 2019-12-11 DIAGNOSIS — I48.20 CHRONIC ATRIAL FIBRILLATION (H): ICD-10-CM

## 2019-12-11 LAB — INR BLD: 3 (ref 0.86–1.14)

## 2019-12-11 PROCEDURE — 36416 COLLJ CAPILLARY BLOOD SPEC: CPT | Performed by: INTERNAL MEDICINE

## 2019-12-11 PROCEDURE — 85610 PROTHROMBIN TIME: CPT | Mod: QW | Performed by: INTERNAL MEDICINE

## 2020-03-10 PROBLEM — M25.562 CHRONIC PAIN OF LEFT KNEE: Status: RESOLVED | Noted: 2019-09-09 | Resolved: 2020-03-10

## 2020-03-10 PROBLEM — M25.511 CHRONIC RIGHT SHOULDER PAIN: Status: RESOLVED | Noted: 2019-09-09 | Resolved: 2020-03-10

## 2020-03-10 PROBLEM — G89.29 CHRONIC PAIN OF LEFT KNEE: Status: RESOLVED | Noted: 2019-09-09 | Resolved: 2020-03-10

## 2020-03-10 PROBLEM — G89.29 CHRONIC RIGHT SHOULDER PAIN: Status: RESOLVED | Noted: 2019-09-09 | Resolved: 2020-03-10

## 2020-05-04 DIAGNOSIS — I48.20 CHRONIC ATRIAL FIBRILLATION (H): ICD-10-CM

## 2020-05-04 LAB
CAPILLARY BLOOD COLLECTION: NORMAL
INR BLD: 2.1 (ref 0.86–1.14)

## 2020-05-04 PROCEDURE — 36416 COLLJ CAPILLARY BLOOD SPEC: CPT | Performed by: INTERNAL MEDICINE

## 2020-05-04 PROCEDURE — 85610 PROTHROMBIN TIME: CPT | Mod: QW | Performed by: INTERNAL MEDICINE

## 2020-06-05 DIAGNOSIS — I48.20 CHRONIC ATRIAL FIBRILLATION (H): ICD-10-CM

## 2020-06-05 LAB
CAPILLARY BLOOD COLLECTION: NORMAL
INR BLD: 3.9 (ref 0.86–1.14)

## 2020-06-05 PROCEDURE — 36416 COLLJ CAPILLARY BLOOD SPEC: CPT | Performed by: INTERNAL MEDICINE

## 2020-06-05 PROCEDURE — 85610 PROTHROMBIN TIME: CPT | Mod: QW | Performed by: INTERNAL MEDICINE

## 2020-06-19 DIAGNOSIS — I48.20 CHRONIC ATRIAL FIBRILLATION (H): ICD-10-CM

## 2020-06-19 LAB
CAPILLARY BLOOD COLLECTION: NORMAL
INR BLD: 4.5 (ref 0.86–1.14)

## 2020-06-19 PROCEDURE — 36416 COLLJ CAPILLARY BLOOD SPEC: CPT | Performed by: INTERNAL MEDICINE

## 2020-06-19 PROCEDURE — 85610 PROTHROMBIN TIME: CPT | Mod: QW | Performed by: INTERNAL MEDICINE

## 2020-07-02 DIAGNOSIS — I48.20 CHRONIC ATRIAL FIBRILLATION (H): ICD-10-CM

## 2020-07-02 LAB
CAPILLARY BLOOD COLLECTION: NORMAL
INR BLD: 3.1 (ref 0.86–1.14)

## 2020-07-02 PROCEDURE — 36416 COLLJ CAPILLARY BLOOD SPEC: CPT | Performed by: INTERNAL MEDICINE

## 2020-07-02 PROCEDURE — 85610 PROTHROMBIN TIME: CPT | Mod: QW | Performed by: INTERNAL MEDICINE

## 2020-10-19 DIAGNOSIS — I48.91 ATRIAL FIBRILLATION (H): Primary | ICD-10-CM

## 2020-10-19 LAB
CAPILLARY BLOOD COLLECTION: NORMAL
INR BLD: 2.3 (ref 0.86–1.14)

## 2020-10-19 PROCEDURE — 85610 PROTHROMBIN TIME: CPT | Performed by: INTERNAL MEDICINE

## 2020-10-19 PROCEDURE — 36416 COLLJ CAPILLARY BLOOD SPEC: CPT | Performed by: INTERNAL MEDICINE

## 2021-01-20 DIAGNOSIS — I48.91 ATRIAL FIBRILLATION (H): ICD-10-CM

## 2021-01-20 LAB
CAPILLARY BLOOD COLLECTION: NORMAL
INR PPP: 3.1 (ref 0.86–1.14)

## 2021-01-20 PROCEDURE — 36416 COLLJ CAPILLARY BLOOD SPEC: CPT | Performed by: INTERNAL MEDICINE

## 2021-01-20 PROCEDURE — 85610 PROTHROMBIN TIME: CPT | Performed by: INTERNAL MEDICINE

## 2021-04-22 DIAGNOSIS — I48.91 ATRIAL FIBRILLATION (H): ICD-10-CM

## 2021-04-22 LAB
CAPILLARY BLOOD COLLECTION: NORMAL
INR BLD: 2.8 (ref 0.86–1.14)

## 2021-04-22 PROCEDURE — 85610 PROTHROMBIN TIME: CPT | Performed by: INTERNAL MEDICINE

## 2021-04-22 PROCEDURE — 36416 COLLJ CAPILLARY BLOOD SPEC: CPT | Performed by: INTERNAL MEDICINE

## 2021-05-26 ENCOUNTER — HOSPITAL ENCOUNTER (EMERGENCY)
Facility: CLINIC | Age: 81
Discharge: HOME | End: 2021-05-26
Attending: EMERGENCY MEDICINE | Admitting: EMERGENCY MEDICINE
Payer: MEDICARE

## 2021-05-26 VITALS
HEART RATE: 90 BPM | WEIGHT: 180 LBS | OXYGEN SATURATION: 97 % | RESPIRATION RATE: 18 BRPM | TEMPERATURE: 97 F | SYSTOLIC BLOOD PRESSURE: 106 MMHG | DIASTOLIC BLOOD PRESSURE: 89 MMHG

## 2021-05-26 DIAGNOSIS — L03.115 CELLULITIS OF RIGHT LOWER EXTREMITY: ICD-10-CM

## 2021-05-26 PROCEDURE — 99283 EMERGENCY DEPT VISIT LOW MDM: CPT

## 2021-05-26 PROCEDURE — 250N000013 HC RX MED GY IP 250 OP 250 PS 637: Performed by: STUDENT IN AN ORGANIZED HEALTH CARE EDUCATION/TRAINING PROGRAM

## 2021-05-26 RX ORDER — CEPHALEXIN 500 MG/1
1000 CAPSULE ORAL ONCE
Status: COMPLETED | OUTPATIENT
Start: 2021-05-26 | End: 2021-05-26

## 2021-05-26 RX ORDER — CEPHALEXIN 500 MG/1
500 CAPSULE ORAL 3 TIMES DAILY
Qty: 30 CAPSULE | Refills: 0 | Status: SHIPPED | OUTPATIENT
Start: 2021-05-26 | End: 2021-06-05

## 2021-05-26 RX ADMIN — CEPHALEXIN 1000 MG: 500 CAPSULE ORAL at 11:03

## 2021-05-26 ASSESSMENT — ENCOUNTER SYMPTOMS
COLOR CHANGE: 1
CHILLS: 0
ACTIVITY CHANGE: 0
MYALGIAS: 0
FEVER: 0

## 2021-05-26 NOTE — ED PROVIDER NOTES
History     Chief Complaint:    Leg Pain      HPI   Kaycee Fuchs is a 80 year old female who presents with right lower leg pain and redness that started yesterday afternoon. Daughter mark a line around the erythema yesterday and it has expanded as of today. She's feeling well otherwise-- no fevers, nausea, chills. Some bilateral lower extremity swelling over the past week that improves w/ leg elevation. No recent travel or surgeries. Is a  and had a small bleeding scab yesterday on RLE, but otherwise no known trauma.    No prior cellulitis. No chronic lymphedema.       Review of Systems   Constitutional: Negative for activity change, chills and fever.   Cardiovascular: Positive for leg swelling.   Musculoskeletal: Negative for myalgias.   Skin: Positive for color change.   All other systems reviewed and are negative.      Allergies:    Claritin [Loratadine]  Codeine  Zantac      Medications:      Diltiazem 180mg daily  Gabapentin 600mg   advair PRN   Warfarin 5mg AM, wed; 2.5mg remaining days  Singular 10mg daily  Omeprazole 20mg daily   Simvastatin 10mg daily  Amitriptyline 30mg daily  Zyrtec 10mg daily          Past Medical History:    Atrial fibrillation (on warfarin)   Asthma      Past Surgical History:    Bialteral hip replacements ~ 6-7 years ago  Right knee replacement ~5-6 years ago     Family History:    No relevant family history     Social History:  Lives at home w/ . [place]. Loves moving around outside, being physically active. Occasional alcohol use-- ~1 drink per occasion up to 3 times per week. Never smoker.     Physical Exam     Patient Vitals for the past 24 hrs:   BP Temp Temp src Pulse Resp SpO2 Weight   05/26/21 1015 115/81 -- -- 87 -- 98 % --   05/26/21 0948 (!) 126/93 97  F (36.1  C) Temporal 108 18 98 % 81.6 kg (180 lb)       Physical Exam  Constitutional:       General: She is not in acute distress.     Appearance: Normal appearance. She is not ill-appearing.   HENT:       Head: Normocephalic and atraumatic.      Mouth/Throat:      Mouth: Mucous membranes are dry.   Eyes:      Extraocular Movements: Extraocular movements intact.      Comments: Wearing glasses   Neck:      Musculoskeletal: Neck supple.   Cardiovascular:      Pulses: Normal pulses.   Pulmonary:      Effort: Pulmonary effort is normal.      Breath sounds: No wheezing.   Abdominal:      Palpations: Abdomen is soft.   Musculoskeletal:      Right lower leg: Edema present.      Left lower leg: Edema present.      Comments: RLE measuring 26.5cm vs LLE measuring 25cm (both measured 11cm  above the lateral malleolus); ankle ROM normal bilaterally   Skin:     Comments: Poorly circumscribed intense erythema of RLE as pictured below. Warm to the touch w/ taut skin. No blistering. 2 small scabs of RLE (well-healed). No fluctuance underlying erythema. No streaking erythema.    Thickened toenails. No interdigital maceration. A few plantar warts of right foot.   Neurological:      General: No focal deficit present.      Mental Status: She is alert and oriented to person, place, and time.   Psychiatric:         Mood and Affect: Mood normal.         Behavior: Behavior normal.           Emergency Department Course     No labs or images indicated.     Procedures:  None    Emergency Department Course:        ED Course as of May 26 1050   Wed May 26, 2021   1002 Reviewed RN notes, vitals, chief complaint.       1015 Evaluated patient      1030 Discussed plan with patient to take single dose 1g keflex here and discharge home with keflex 500mg TID for 10d to treat RLE cellulitis. She understands and agrees with plan.           Interventions:  Medications   cephALEXin (KEFLEX) capsule 1,000 mg (has no administration in time range)       Disposition:  The patient was discharged to home.    Impression & Plan        Medical Decision Making:    Kaycee is an 80-year-old woman with atrial fibrillation and asthma who presents with less than 24 hours  of right lower extremity painful erythema.  She is otherwise well-appearing, vitals are normal, and she is afebrile.  Physical exam shows warm, poorly circumscribed erythema overlying the right shin that is expanded beyond the lines drawn by her daughter yesterday.  Presentation most consistent with RLE cellulitis.  Give 1 g Keflex here in the ED, and discharged to home with plan to take 500 mg Keflex 3 times daily for 10 days.  Encouraged patient to follow-up with her primary care provider within a week to determine whether or not that course can be shortened.  Provided return precautions including new fever, streaking erythema up the leg, or any other new or concerning signs or symptoms.    Considered DVT, but this is exceedingly low risk (Wells score negative two- mild swelling of right lower extremity greater than left [not 3cm dif], most recent INR therapeutic at 2.7, no recent surgery, no recent travel, etc).     Diagnosis:    ICD-10-CM    1. Cellulitis of right lower extremity  L03.115        Discharge Medications:  New Prescriptions    CEPHALEXIN (KEFLEX) 500 MG CAPSULE    Take 1 capsule (500 mg) by mouth 3 times daily for 10 days     Jo Ann Bautista MD, PGY-2       Jo Ann Bautista MD  Resident  05/26/21 1055       Emergency Department Attending Supervision Note  5/26/2021  2:24 PM      I evaluated this patient in conjunction with DR BAUTISTA      Briefly, the patient presented with right lower extremity redness.      On my exam, patient has an area of cellulitis over the anterior shin that measures approximately 4 to 5 cm ovoid in distribution over the anterior shin without circumferential redness.  Normal distal DP and PT pulse.  EXCOriations on the skin likely nidus for infection    Results:    ED course:    My impression is cellulitis without concern for DVT due to active anticoagulation and clinical exam consistent with cellulitis patient be discharged with antibiotics and return with worsening  condition.      Diagnosis    ICD-10-CM    1. Cellulitis of right lower extremity  L03.115          No att. providers found          Jean Bee MD  05/26/21 8653

## 2021-05-26 NOTE — ED TRIAGE NOTES
Patient reports right lower leg pain and redness that started yesterday afternoon. Denies fever/chills.

## 2021-10-05 ENCOUNTER — LAB (OUTPATIENT)
Dept: LAB | Facility: CLINIC | Age: 81
End: 2021-10-05
Payer: MEDICARE

## 2021-10-05 DIAGNOSIS — I48.91 ATRIAL FIBRILLATION (H): ICD-10-CM

## 2021-10-05 LAB — INR BLD: 1.7 (ref 0.9–1.1)

## 2021-10-05 PROCEDURE — 85610 PROTHROMBIN TIME: CPT

## 2021-10-05 PROCEDURE — 36416 COLLJ CAPILLARY BLOOD SPEC: CPT

## 2022-03-23 ENCOUNTER — LAB (OUTPATIENT)
Dept: LAB | Facility: CLINIC | Age: 82
End: 2022-03-23
Payer: MEDICARE

## 2022-03-23 DIAGNOSIS — I48.91 ATRIAL FIBRILLATION (H): ICD-10-CM

## 2022-03-23 LAB — INR BLD: 3 (ref 0.9–1.1)

## 2022-03-23 PROCEDURE — 85610 PROTHROMBIN TIME: CPT

## 2022-03-23 PROCEDURE — 36416 COLLJ CAPILLARY BLOOD SPEC: CPT

## 2022-03-29 ENCOUNTER — TELEPHONE (OUTPATIENT)
Dept: FAMILY MEDICINE | Facility: CLINIC | Age: 82
End: 2022-03-29
Payer: MEDICARE

## 2022-03-29 ENCOUNTER — MEDICAL CORRESPONDENCE (OUTPATIENT)
Dept: HEALTH INFORMATION MANAGEMENT | Facility: CLINIC | Age: 82
End: 2022-03-29
Payer: MEDICARE

## 2022-03-29 DIAGNOSIS — I48.91 ATRIAL FIBRILLATION (H): Primary | ICD-10-CM

## 2022-03-29 NOTE — TELEPHONE ENCOUNTER
[facility] called, they manage wht patient's coumadin/INR    [place].      Informed the nurse of the INR result from 3/23/22 value was 3.0    Caller will send in an order form so that the INR's are faxed.     Livier DUPREE RN   [facility] Triage

## 2022-05-27 ENCOUNTER — LAB (OUTPATIENT)
Dept: LAB | Facility: CLINIC | Age: 82
End: 2022-05-27
Payer: MEDICARE

## 2022-05-27 DIAGNOSIS — I48.91 ATRIAL FIBRILLATION (H): ICD-10-CM

## 2022-05-27 LAB — INR BLD: 2.5 (ref 0.9–1.1)

## 2022-05-27 PROCEDURE — 85610 PROTHROMBIN TIME: CPT

## 2022-05-27 PROCEDURE — 36416 COLLJ CAPILLARY BLOOD SPEC: CPT

## 2022-07-16 NOTE — LETTER
DEPARTMENT OF HEALTH AND HUMAN SERVICES  CENTERS FOR MEDICARE & MEDICAID SERVICES    PLAN/UPDATED PLAN OF PROGRESS FOR OUTPATIENT REHABILITATION    PATIENT NAME:  Kaycee Fuchs   : 1940  PROVIDER NUMBER:    [id number]  [id number]   PROVIDER NAME: [facility]  MEDICAL RECORD NUMBER: [id number]   START OF CARE DATE:  SOC Date: 17   TYPE:  PT  PRIMARY/TREATMENT DIAGNOSIS: (Pertinent Medical Diagnosis)  Right shoulder pain, unspecified chronicity  VISITS FROM START OF CARE:  Rxs Used: 1     Subjective:  Kaycee Fuchs is a 76-year-old female with a right shoulder condition.  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 R shoulder injection last friday.).  Condition occurred: for unknown reasons.  This is a new condition 2016.    Patient reports pain: Posterior.  Pain is described as aching and is intermittent and reported as 5/10.  Associated symptoms: Loss of motion/stiffness. Pain is worse during the night and worse during the day.  Symptoms are exacerbated by certain positions, using arm overhead, using arm at shoulder level, lifting and lying on extremity and relieved by ice.  Since onset symptoms are unchanged.  Special tests: MRI.  Previous treatment: shoulder injection.  There was mild and no improvement following previous treatment.  General health as reported by patient is good.  Pertinent medical history includes: Osteoarthritis, heart problems and asthma.  Medical allergies: no.  Other surgeries include: Orthopedic surgery (2 COOPER, 1 TKA).  Current medications:  Heparin/coumadin.  Current occupation is Retired.  Barriers include: None as reported by the patient.  Red flags: None as reported by the patient.                   Objective:  Standing Alignment:    Shoulder/UE:  Rounded shoulders    Shoulder Evaluation:  ROM:  AROM:    Flexion:  Left:  160    Right:  90  Abduction:  Left: 160   Right:  90  External Rotation:  Left:  70    Right:  70  PROM:  normal  Strength:    Flexion: Right: 3/5     Pain:   Abduction:  Right: 3/5     Pain:  Internal Rotation:    Left:5-/5     Pain:    Right: 5-/5     Pain:  External Rotation:   Left:5-/5     Pain:   Right:4-/5      Pain:+    PATIENT NAME:  Kaycee Fuchs   : 1940      Assessment/Plan:    Patient is a 76-year-old female with right side shoulder complaints.    Patient has the following significant findings with corresponding treatment plan.                Diagnosis 1:  R shoulder pain  Pain -  hot/cold therapy, self management, education and home program  Decreased ROM/flexibility - manual therapy and therapeutic exercise  Decreased strength - therapeutic exercise and therapeutic activities  Inflammation - cold therapy  Decreased function - therapeutic activities  Impaired posture - neuro re-education    Therapy Evaluation Codes:   1) History comprised of:   Personal factors that impact the plan of care:  None.    Comorbidity factors that impact the plan of care are:  Weakness.     Medications impacting care: None.  2) Examination of Body Systems comprised of:   Body structures and functions that impact the plan of care:  Shoulder.   Activity limitations that impact the plan of care are:     Bathing, Cooking, Driving, Dressing, Lifting and Laying down.  3) Clinical presentation characteristics are: Stable/Uncomplicated.  4) Decision-Making   Low complexity using standardized patient assessment instrument and/or measureable  assessment of functional outcome.  Cumulative Therapy Evaluation is: Low complexity.    Previous and current functional limitations: (See Goal Flow Sheet for this information)    Short term and Long term goals: (See Goal Flow Sheet for this information)  "  Communication ability: Patient appears to be able to clearly communicate and understand verbal and written communication and follow directions correctly.  Treatment Explanation - The following has been discussed with the patient: RX ordered/plan of care  Anticipated outcomes  Possible risks and side effects  This patient would benefit from PT intervention to resume normal activities.   Rehab potential is excellent.  Frequency:  1 X week, once daily  Duration:  for 6 weeks  Discharge Plan:  Achieve all LTG.  Independent in home treatment program.  Reach maximal therapeutic benefit.      Caregiver Signature/Credentials _____________________________ Date __________           Treating Provider: David Muñoz PT     PATIENT NAME:  Kaycee Fuchs   : 1940            I have reviewed and certified the need for these services and plan of treatment while under my care.    PHYSICIAN'S SIGNATURE:   _____________________________________  Date___________            Sky Willett          Certification period:  Beginning of Cert date period: 17 to  End of Cert period date: 17     Functional Level Progress Report: Please see attached \"Goal Flow sheet for Functional level.\"    ____X____ Continue Services or       ________ DC Services                Service dates: From  SOC Date: 17 date to present                         " 5

## 2022-08-04 ENCOUNTER — LAB (OUTPATIENT)
Dept: LAB | Facility: CLINIC | Age: 82
End: 2022-08-04
Payer: MEDICARE

## 2022-08-04 DIAGNOSIS — I48.91 ATRIAL FIBRILLATION (H): ICD-10-CM

## 2022-08-04 LAB — INR BLD: 1.3 (ref 0.9–1.1)

## 2022-08-04 PROCEDURE — 36416 COLLJ CAPILLARY BLOOD SPEC: CPT

## 2022-08-04 PROCEDURE — 85610 PROTHROMBIN TIME: CPT

## 2022-08-12 ENCOUNTER — LAB (OUTPATIENT)
Dept: LAB | Facility: CLINIC | Age: 82
End: 2022-08-12
Payer: MEDICARE

## 2022-08-12 DIAGNOSIS — I48.91 ATRIAL FIBRILLATION (H): ICD-10-CM

## 2022-08-12 LAB — INR BLD: 2.1 (ref 0.9–1.1)

## 2022-08-12 PROCEDURE — 36415 COLL VENOUS BLD VENIPUNCTURE: CPT

## 2022-08-12 PROCEDURE — 85610 PROTHROMBIN TIME: CPT

## 2022-08-18 ENCOUNTER — LAB (OUTPATIENT)
Dept: LAB | Facility: CLINIC | Age: 82
End: 2022-08-18
Payer: MEDICARE

## 2022-08-18 DIAGNOSIS — I48.91 ATRIAL FIBRILLATION (H): ICD-10-CM

## 2022-08-18 LAB — INR BLD: 2.8 (ref 0.9–1.1)

## 2022-08-18 PROCEDURE — 85610 PROTHROMBIN TIME: CPT

## 2022-08-18 PROCEDURE — 36416 COLLJ CAPILLARY BLOOD SPEC: CPT

## 2022-08-29 ENCOUNTER — LAB (OUTPATIENT)
Dept: LAB | Facility: CLINIC | Age: 82
End: 2022-08-29
Payer: MEDICARE

## 2022-08-29 DIAGNOSIS — I48.91 ATRIAL FIBRILLATION (H): ICD-10-CM

## 2022-08-29 LAB — INR BLD: 2.7 (ref 0.9–1.1)

## 2022-08-29 PROCEDURE — 36415 COLL VENOUS BLD VENIPUNCTURE: CPT

## 2022-08-29 PROCEDURE — 85610 PROTHROMBIN TIME: CPT

## 2022-09-28 ENCOUNTER — LAB (OUTPATIENT)
Dept: LAB | Facility: CLINIC | Age: 82
End: 2022-09-28
Payer: MEDICARE

## 2022-09-28 DIAGNOSIS — I48.91 ATRIAL FIBRILLATION (H): ICD-10-CM

## 2022-09-28 LAB — INR BLD: 2.4 (ref 0.9–1.1)

## 2022-09-28 PROCEDURE — 36416 COLLJ CAPILLARY BLOOD SPEC: CPT

## 2022-09-28 PROCEDURE — 85610 PROTHROMBIN TIME: CPT

## 2022-12-27 ENCOUNTER — DOCUMENTATION ONLY (OUTPATIENT)
Dept: ANTICOAGULATION | Facility: CLINIC | Age: 82
End: 2022-12-27

## 2022-12-27 ENCOUNTER — LAB (OUTPATIENT)
Dept: LAB | Facility: CLINIC | Age: 82
End: 2022-12-27
Payer: MEDICARE

## 2022-12-27 DIAGNOSIS — I48.91 ATRIAL FIBRILLATION (H): ICD-10-CM

## 2022-12-27 LAB
INR BLD: 7.2 (ref 0.9–1.1)
INR PPP: 4.65 (ref 0.85–1.15)

## 2022-12-27 PROCEDURE — 36416 COLLJ CAPILLARY BLOOD SPEC: CPT

## 2022-12-27 PROCEDURE — 85610 PROTHROMBIN TIME: CPT

## 2022-12-27 NOTE — PROGRESS NOTES
Critical INR result was called to [facility] by [facility] lab staff member. This patient is not managed by [facility], she is managed by [facility]. Lab notified of this and will ensure correct Provider is contacted regarding critical INR lab value.    Alejandra Clark RN, BSN  [facility]  [phone]

## 2023-01-02 ENCOUNTER — LAB (OUTPATIENT)
Dept: LAB | Facility: CLINIC | Age: 83
End: 2023-01-02
Payer: MEDICARE

## 2023-01-02 DIAGNOSIS — I48.91 ATRIAL FIBRILLATION (H): ICD-10-CM

## 2023-01-02 LAB — INR BLD: 3.4 (ref 0.9–1.1)

## 2023-01-02 PROCEDURE — 85610 PROTHROMBIN TIME: CPT

## 2023-01-02 PROCEDURE — 36416 COLLJ CAPILLARY BLOOD SPEC: CPT

## 2023-03-20 ENCOUNTER — LAB (OUTPATIENT)
Dept: LAB | Facility: CLINIC | Age: 83
End: 2023-03-20
Payer: MEDICARE

## 2023-03-20 DIAGNOSIS — I48.91 ATRIAL FIBRILLATION (H): ICD-10-CM

## 2023-03-20 LAB — INR BLD: 5.4 (ref 0.9–1.1)

## 2023-03-20 PROCEDURE — 36416 COLLJ CAPILLARY BLOOD SPEC: CPT

## 2023-03-20 PROCEDURE — 85610 PROTHROMBIN TIME: CPT

## 2023-03-31 ENCOUNTER — LAB (OUTPATIENT)
Dept: LAB | Facility: CLINIC | Age: 83
End: 2023-03-31
Payer: MEDICARE

## 2023-03-31 DIAGNOSIS — I48.91 ATRIAL FIBRILLATION (H): ICD-10-CM

## 2023-03-31 LAB — INR BLD: 3.3 (ref 0.9–1.1)

## 2023-03-31 PROCEDURE — 36416 COLLJ CAPILLARY BLOOD SPEC: CPT

## 2023-03-31 PROCEDURE — 85610 PROTHROMBIN TIME: CPT

## 2023-06-14 ENCOUNTER — LAB (OUTPATIENT)
Dept: LAB | Facility: CLINIC | Age: 83
End: 2023-06-14
Payer: MEDICARE

## 2023-06-14 DIAGNOSIS — I48.91 ATRIAL FIBRILLATION (H): ICD-10-CM

## 2023-06-14 LAB — INR BLD: 4.4 (ref 0.9–1.1)

## 2023-06-14 PROCEDURE — 85610 PROTHROMBIN TIME: CPT

## 2023-06-14 PROCEDURE — 36416 COLLJ CAPILLARY BLOOD SPEC: CPT

## 2023-06-23 ENCOUNTER — LAB (OUTPATIENT)
Dept: LAB | Facility: CLINIC | Age: 83
End: 2023-06-23
Payer: MEDICARE

## 2023-06-23 DIAGNOSIS — I48.91 ATRIAL FIBRILLATION (H): ICD-10-CM

## 2023-06-23 LAB — INR BLD: 4.5 (ref 0.9–1.1)

## 2023-06-23 PROCEDURE — 36416 COLLJ CAPILLARY BLOOD SPEC: CPT

## 2023-06-23 PROCEDURE — 85610 PROTHROMBIN TIME: CPT

## 2023-06-30 ENCOUNTER — LAB (OUTPATIENT)
Dept: LAB | Facility: CLINIC | Age: 83
End: 2023-06-30
Payer: MEDICARE

## 2023-06-30 DIAGNOSIS — I48.91 ATRIAL FIBRILLATION (H): ICD-10-CM

## 2023-06-30 LAB — INR BLD: 1.8 (ref 0.9–1.1)

## 2023-06-30 PROCEDURE — 36416 COLLJ CAPILLARY BLOOD SPEC: CPT

## 2023-06-30 PROCEDURE — 85610 PROTHROMBIN TIME: CPT

## 2023-07-07 ENCOUNTER — LAB (OUTPATIENT)
Dept: LAB | Facility: CLINIC | Age: 83
End: 2023-07-07
Payer: MEDICARE

## 2023-07-07 DIAGNOSIS — I48.91 ATRIAL FIBRILLATION (H): ICD-10-CM

## 2023-07-07 LAB — INR BLD: 1.8 (ref 0.9–1.1)

## 2023-07-07 PROCEDURE — 85610 PROTHROMBIN TIME: CPT

## 2023-07-07 PROCEDURE — 36416 COLLJ CAPILLARY BLOOD SPEC: CPT

## 2023-07-18 ENCOUNTER — LAB (OUTPATIENT)
Dept: LAB | Facility: CLINIC | Age: 83
End: 2023-07-18
Payer: MEDICARE

## 2023-07-18 DIAGNOSIS — I48.91 ATRIAL FIBRILLATION (H): ICD-10-CM

## 2023-07-18 LAB — INR BLD: 1.9 (ref 0.9–1.1)

## 2023-07-18 PROCEDURE — 36416 COLLJ CAPILLARY BLOOD SPEC: CPT

## 2023-07-18 PROCEDURE — 85610 PROTHROMBIN TIME: CPT

## 2023-08-08 ENCOUNTER — LAB (OUTPATIENT)
Dept: LAB | Facility: CLINIC | Age: 83
End: 2023-08-08
Payer: MEDICARE

## 2023-08-08 DIAGNOSIS — I48.91 ATRIAL FIBRILLATION (H): ICD-10-CM

## 2023-08-08 LAB — INR BLD: 3 (ref 0.9–1.1)

## 2023-08-08 PROCEDURE — 36415 COLL VENOUS BLD VENIPUNCTURE: CPT

## 2023-08-08 PROCEDURE — 85610 PROTHROMBIN TIME: CPT

## 2023-09-07 ENCOUNTER — LAB (OUTPATIENT)
Dept: LAB | Facility: CLINIC | Age: 83
End: 2023-09-07
Payer: MEDICARE

## 2023-09-07 DIAGNOSIS — I48.91 ATRIAL FIBRILLATION (H): ICD-10-CM

## 2023-09-07 LAB — INR BLD: 3 (ref 0.9–1.1)

## 2023-09-07 PROCEDURE — 36416 COLLJ CAPILLARY BLOOD SPEC: CPT

## 2023-09-07 PROCEDURE — 85610 PROTHROMBIN TIME: CPT

## 2023-10-05 ENCOUNTER — HOSPITAL ENCOUNTER (INPATIENT)
Facility: CLINIC | Age: 83
LOS: 2 days | Discharge: HOME | DRG: 871 | End: 2023-10-08
Attending: EMERGENCY MEDICINE | Admitting: STUDENT IN AN ORGANIZED HEALTH CARE EDUCATION/TRAINING PROGRAM
Payer: MEDICARE

## 2023-10-05 ENCOUNTER — APPOINTMENT (OUTPATIENT)
Dept: GENERAL RADIOLOGY | Facility: CLINIC | Age: 83
DRG: 871 | End: 2023-10-05
Attending: EMERGENCY MEDICINE
Payer: MEDICARE

## 2023-10-05 DIAGNOSIS — I48.91 ATRIAL FIBRILLATION WITH RAPID VENTRICULAR RESPONSE (H): ICD-10-CM

## 2023-10-05 DIAGNOSIS — J15.9 COMMUNITY ACQUIRED BACTERIAL PNEUMONIA: ICD-10-CM

## 2023-10-05 LAB
ALBUMIN SERPL BCG-MCNC: 3.8 G/DL (ref 3.5–5.2)
ALP SERPL-CCNC: 66 U/L (ref 35–104)
ALT SERPL W P-5'-P-CCNC: 9 U/L (ref 0–50)
ANION GAP SERPL CALCULATED.3IONS-SCNC: 8 MMOL/L (ref 7–15)
AST SERPL W P-5'-P-CCNC: 17 U/L (ref 0–45)
BASE EXCESS BLDV CALC-SCNC: 7 MMOL/L (ref -7.7–1.9)
BASO+EOS+MONOS # BLD AUTO: ABNORMAL 10*3/UL
BASO+EOS+MONOS NFR BLD AUTO: ABNORMAL %
BASOPHILS # BLD AUTO: 0.1 10E3/UL (ref 0–0.2)
BASOPHILS NFR BLD AUTO: 0 %
BILIRUB SERPL-MCNC: 0.5 MG/DL
BUN SERPL-MCNC: 12.8 MG/DL (ref 8–23)
CALCIUM SERPL-MCNC: 9.4 MG/DL (ref 8.8–10.2)
CHLORIDE SERPL-SCNC: 98 MMOL/L (ref 98–107)
CREAT SERPL-MCNC: 0.96 MG/DL (ref 0.51–0.95)
DEPRECATED HCO3 PLAS-SCNC: 28 MMOL/L (ref 22–29)
EGFRCR SERPLBLD CKD-EPI 2021: 59 ML/MIN/1.73M2
EOSINOPHIL # BLD AUTO: 0.2 10E3/UL (ref 0–0.7)
EOSINOPHIL NFR BLD AUTO: 1 %
ERYTHROCYTE [DISTWIDTH] IN BLOOD BY AUTOMATED COUNT: 13.1 % (ref 10–15)
GLUCOSE SERPL-MCNC: 116 MG/DL (ref 70–99)
HCO3 BLDV-SCNC: 31 MMOL/L (ref 21–28)
HCT VFR BLD AUTO: 42.9 % (ref 35–47)
HGB BLD-MCNC: 14.6 G/DL (ref 11.7–15.7)
IMM GRANULOCYTES # BLD: 0.1 10E3/UL
IMM GRANULOCYTES NFR BLD: 1 %
INR PPP: 2.75 (ref 0.85–1.15)
LACTATE SERPL-SCNC: 0.9 MMOL/L (ref 0.7–2)
LYMPHOCYTES # BLD AUTO: 2 10E3/UL (ref 0.8–5.3)
LYMPHOCYTES NFR BLD AUTO: 9 %
MCH RBC QN AUTO: 32.5 PG (ref 26.5–33)
MCHC RBC AUTO-ENTMCNC: 34 G/DL (ref 31.5–36.5)
MCV RBC AUTO: 96 FL (ref 78–100)
MONOCYTES # BLD AUTO: 1.2 10E3/UL (ref 0–1.3)
MONOCYTES NFR BLD AUTO: 6 %
NEUTROPHILS # BLD AUTO: 17.6 10E3/UL (ref 1.6–8.3)
NEUTROPHILS NFR BLD AUTO: 83 %
NRBC # BLD AUTO: 0 10E3/UL
NRBC BLD AUTO-RTO: 0 /100
O2/TOTAL GAS SETTING VFR VENT: 21 %
OXYHGB MFR BLDV: 81 % (ref 70–75)
PCO2 BLDV: 42 MM HG (ref 40–50)
PH BLDV: 7.48 [PH] (ref 7.32–7.43)
PLATELET # BLD AUTO: 283 10E3/UL (ref 150–450)
PO2 BLDV: 43 MM HG (ref 25–47)
POTASSIUM SERPL-SCNC: 4.5 MMOL/L (ref 3.4–5.3)
PROT SERPL-MCNC: 6.9 G/DL (ref 6.4–8.3)
RBC # BLD AUTO: 4.49 10E6/UL (ref 3.8–5.2)
SODIUM SERPL-SCNC: 134 MMOL/L (ref 135–145)
WBC # BLD AUTO: 21.1 10E3/UL (ref 4–11)

## 2023-10-05 PROCEDURE — 83605 ASSAY OF LACTIC ACID: CPT | Performed by: EMERGENCY MEDICINE

## 2023-10-05 PROCEDURE — 93005 ELECTROCARDIOGRAM TRACING: CPT

## 2023-10-05 PROCEDURE — 96374 THER/PROPH/DIAG INJ IV PUSH: CPT | Mod: 59

## 2023-10-05 PROCEDURE — 250N000011 HC RX IP 250 OP 636: Mod: JZ | Performed by: EMERGENCY MEDICINE

## 2023-10-05 PROCEDURE — 85610 PROTHROMBIN TIME: CPT | Performed by: EMERGENCY MEDICINE

## 2023-10-05 PROCEDURE — 250N000013 HC RX MED GY IP 250 OP 250 PS 637: Performed by: EMERGENCY MEDICINE

## 2023-10-05 PROCEDURE — 80053 COMPREHEN METABOLIC PANEL: CPT | Performed by: EMERGENCY MEDICINE

## 2023-10-05 PROCEDURE — 87637 SARSCOV2&INF A&B&RSV AMP PRB: CPT | Performed by: EMERGENCY MEDICINE

## 2023-10-05 PROCEDURE — 36415 COLL VENOUS BLD VENIPUNCTURE: CPT | Performed by: EMERGENCY MEDICINE

## 2023-10-05 PROCEDURE — 87040 BLOOD CULTURE FOR BACTERIA: CPT | Performed by: EMERGENCY MEDICINE

## 2023-10-05 PROCEDURE — 71046 X-RAY EXAM CHEST 2 VIEWS: CPT

## 2023-10-05 PROCEDURE — 82805 BLOOD GASES W/O2 SATURATION: CPT | Performed by: EMERGENCY MEDICINE

## 2023-10-05 PROCEDURE — 258N000003 HC RX IP 258 OP 636: Performed by: EMERGENCY MEDICINE

## 2023-10-05 PROCEDURE — 99285 EMERGENCY DEPT VISIT HI MDM: CPT | Mod: 25

## 2023-10-05 PROCEDURE — 85048 AUTOMATED LEUKOCYTE COUNT: CPT | Performed by: EMERGENCY MEDICINE

## 2023-10-05 RX ORDER — ACETAMINOPHEN 500 MG
1000 TABLET ORAL ONCE
Status: COMPLETED | OUTPATIENT
Start: 2023-10-05 | End: 2023-10-05

## 2023-10-05 RX ORDER — DILTIAZEM HYDROCHLORIDE 5 MG/ML
10 INJECTION INTRAVENOUS ONCE
Status: COMPLETED | OUTPATIENT
Start: 2023-10-05 | End: 2023-10-05

## 2023-10-05 RX ADMIN — DILTIAZEM HYDROCHLORIDE 10 MG: 5 INJECTION INTRAVENOUS at 23:27

## 2023-10-05 RX ADMIN — ACETAMINOPHEN TAB 500 MG 1000 MG: 500 TAB at 23:26

## 2023-10-05 RX ADMIN — SODIUM CHLORIDE 500 ML: 9 INJECTION, SOLUTION INTRAVENOUS at 23:52

## 2023-10-05 ASSESSMENT — ACTIVITIES OF DAILY LIVING (ADL): ADLS_ACUITY_SCORE: 35

## 2023-10-06 ENCOUNTER — APPOINTMENT (OUTPATIENT)
Dept: CT IMAGING | Facility: CLINIC | Age: 83
DRG: 871 | End: 2023-10-06
Attending: EMERGENCY MEDICINE
Payer: MEDICARE

## 2023-10-06 PROBLEM — J15.9 COMMUNITY ACQUIRED BACTERIAL PNEUMONIA: Status: ACTIVE | Noted: 2023-10-06

## 2023-10-06 PROBLEM — I48.91 ATRIAL FIBRILLATION WITH RAPID VENTRICULAR RESPONSE (H): Status: ACTIVE | Noted: 2023-10-06

## 2023-10-06 LAB
ANION GAP SERPL CALCULATED.3IONS-SCNC: 7 MMOL/L (ref 7–15)
ATRIAL RATE - MUSE: NORMAL BPM
BUN SERPL-MCNC: 13 MG/DL (ref 8–23)
CALCIUM SERPL-MCNC: 8.6 MG/DL (ref 8.8–10.2)
CHLORIDE SERPL-SCNC: 102 MMOL/L (ref 98–107)
CREAT SERPL-MCNC: 0.96 MG/DL (ref 0.51–0.95)
DEPRECATED HCO3 PLAS-SCNC: 28 MMOL/L (ref 22–29)
DIASTOLIC BLOOD PRESSURE - MUSE: NORMAL MMHG
EGFRCR SERPLBLD CKD-EPI 2021: 59 ML/MIN/1.73M2
ERYTHROCYTE [DISTWIDTH] IN BLOOD BY AUTOMATED COUNT: 13.2 % (ref 10–15)
FLUAV RNA SPEC QL NAA+PROBE: NEGATIVE
FLUBV RNA RESP QL NAA+PROBE: NEGATIVE
GLUCOSE BLDC GLUCOMTR-MCNC: 115 MG/DL (ref 70–99)
GLUCOSE SERPL-MCNC: 92 MG/DL (ref 70–99)
HCT VFR BLD AUTO: 40.6 % (ref 35–47)
HGB BLD-MCNC: 13.3 G/DL (ref 11.7–15.7)
HOLD SPECIMEN: NORMAL
INR PPP: 2.74 (ref 0.85–1.15)
INTERPRETATION ECG - MUSE: NORMAL
MAGNESIUM SERPL-MCNC: 2.1 MG/DL (ref 1.7–2.3)
MCH RBC QN AUTO: 32.7 PG (ref 26.5–33)
MCHC RBC AUTO-ENTMCNC: 32.8 G/DL (ref 31.5–36.5)
MCV RBC AUTO: 100 FL (ref 78–100)
P AXIS - MUSE: NORMAL DEGREES
PLATELET # BLD AUTO: 263 10E3/UL (ref 150–450)
POTASSIUM SERPL-SCNC: 4.1 MMOL/L (ref 3.4–5.3)
PR INTERVAL - MUSE: NORMAL MS
QRS DURATION - MUSE: 72 MS
QT - MUSE: 322 MS
QTC - MUSE: 457 MS
R AXIS - MUSE: 58 DEGREES
RBC # BLD AUTO: 4.07 10E6/UL (ref 3.8–5.2)
RSV RNA SPEC NAA+PROBE: NEGATIVE
SARS-COV-2 RNA RESP QL NAA+PROBE: NEGATIVE
SODIUM SERPL-SCNC: 137 MMOL/L (ref 135–145)
SYSTOLIC BLOOD PRESSURE - MUSE: NORMAL MMHG
T AXIS - MUSE: 86 DEGREES
VENTRICULAR RATE- MUSE: 121 BPM
WBC # BLD AUTO: 15.8 10E3/UL (ref 4–11)

## 2023-10-06 PROCEDURE — 250N000011 HC RX IP 250 OP 636: Mod: JZ | Performed by: EMERGENCY MEDICINE

## 2023-10-06 PROCEDURE — 80048 BASIC METABOLIC PNL TOTAL CA: CPT | Performed by: STUDENT IN AN ORGANIZED HEALTH CARE EDUCATION/TRAINING PROGRAM

## 2023-10-06 PROCEDURE — 250N000013 HC RX MED GY IP 250 OP 250 PS 637: Performed by: INTERNAL MEDICINE

## 2023-10-06 PROCEDURE — 96361 HYDRATE IV INFUSION ADD-ON: CPT

## 2023-10-06 PROCEDURE — 250N000009 HC RX 250: Performed by: EMERGENCY MEDICINE

## 2023-10-06 PROCEDURE — 120N000001 HC R&B MED SURG/OB

## 2023-10-06 PROCEDURE — 99223 1ST HOSP IP/OBS HIGH 75: CPT | Mod: AI | Performed by: STUDENT IN AN ORGANIZED HEALTH CARE EDUCATION/TRAINING PROGRAM

## 2023-10-06 PROCEDURE — 99207 PR NO BILLABLE SERVICE THIS VISIT: CPT | Performed by: INTERNAL MEDICINE

## 2023-10-06 PROCEDURE — 36415 COLL VENOUS BLD VENIPUNCTURE: CPT | Performed by: STUDENT IN AN ORGANIZED HEALTH CARE EDUCATION/TRAINING PROGRAM

## 2023-10-06 PROCEDURE — 85610 PROTHROMBIN TIME: CPT | Performed by: STUDENT IN AN ORGANIZED HEALTH CARE EDUCATION/TRAINING PROGRAM

## 2023-10-06 PROCEDURE — 258N000003 HC RX IP 258 OP 636: Performed by: STUDENT IN AN ORGANIZED HEALTH CARE EDUCATION/TRAINING PROGRAM

## 2023-10-06 PROCEDURE — 85027 COMPLETE CBC AUTOMATED: CPT | Performed by: STUDENT IN AN ORGANIZED HEALTH CARE EDUCATION/TRAINING PROGRAM

## 2023-10-06 PROCEDURE — 250N000013 HC RX MED GY IP 250 OP 250 PS 637: Performed by: EMERGENCY MEDICINE

## 2023-10-06 PROCEDURE — 258N000003 HC RX IP 258 OP 636: Performed by: EMERGENCY MEDICINE

## 2023-10-06 PROCEDURE — 96375 TX/PRO/DX INJ NEW DRUG ADDON: CPT

## 2023-10-06 PROCEDURE — 250N000011 HC RX IP 250 OP 636: Mod: JZ | Performed by: STUDENT IN AN ORGANIZED HEALTH CARE EDUCATION/TRAINING PROGRAM

## 2023-10-06 PROCEDURE — 71260 CT THORAX DX C+: CPT | Mod: MG

## 2023-10-06 PROCEDURE — 83735 ASSAY OF MAGNESIUM: CPT | Performed by: INTERNAL MEDICINE

## 2023-10-06 PROCEDURE — 250N000011 HC RX IP 250 OP 636: Performed by: EMERGENCY MEDICINE

## 2023-10-06 PROCEDURE — 82962 GLUCOSE BLOOD TEST: CPT

## 2023-10-06 PROCEDURE — 250N000013 HC RX MED GY IP 250 OP 250 PS 637: Performed by: STUDENT IN AN ORGANIZED HEALTH CARE EDUCATION/TRAINING PROGRAM

## 2023-10-06 RX ORDER — CEFTRIAXONE 1 G/1
1 INJECTION, POWDER, FOR SOLUTION INTRAMUSCULAR; INTRAVENOUS EVERY 24 HOURS
Status: DISCONTINUED | OUTPATIENT
Start: 2023-10-07 | End: 2023-10-08 | Stop reason: HOSPADM

## 2023-10-06 RX ORDER — METOPROLOL TARTRATE 1 MG/ML
5 INJECTION, SOLUTION INTRAVENOUS EVERY 5 MIN PRN
Status: DISCONTINUED | OUTPATIENT
Start: 2023-10-06 | End: 2023-10-08 | Stop reason: HOSPADM

## 2023-10-06 RX ORDER — SODIUM CHLORIDE, SODIUM LACTATE, POTASSIUM CHLORIDE, CALCIUM CHLORIDE 600; 310; 30; 20 MG/100ML; MG/100ML; MG/100ML; MG/100ML
INJECTION, SOLUTION INTRAVENOUS CONTINUOUS
Status: DISCONTINUED | OUTPATIENT
Start: 2023-10-06 | End: 2023-10-07

## 2023-10-06 RX ORDER — ACETAMINOPHEN 325 MG/1
650 TABLET ORAL EVERY 6 HOURS PRN
Status: DISCONTINUED | OUTPATIENT
Start: 2023-10-06 | End: 2023-10-08 | Stop reason: HOSPADM

## 2023-10-06 RX ORDER — AZITHROMYCIN 500 MG/5ML
500 INJECTION, POWDER, LYOPHILIZED, FOR SOLUTION INTRAVENOUS ONCE
Status: COMPLETED | OUTPATIENT
Start: 2023-10-06 | End: 2023-10-06

## 2023-10-06 RX ORDER — AMITRIPTYLINE HYDROCHLORIDE 10 MG/1
30 TABLET ORAL EVERY MORNING
Status: DISCONTINUED | OUTPATIENT
Start: 2023-10-06 | End: 2023-10-08 | Stop reason: HOSPADM

## 2023-10-06 RX ORDER — FLUTICASONE PROPIONATE 50 MCG
1 SPRAY, SUSPENSION (ML) NASAL DAILY
Status: DISCONTINUED | OUTPATIENT
Start: 2023-10-06 | End: 2023-10-08 | Stop reason: HOSPADM

## 2023-10-06 RX ORDER — FLUTICASONE FUROATE AND VILANTEROL 100; 25 UG/1; UG/1
1 POWDER RESPIRATORY (INHALATION) DAILY
Status: DISCONTINUED | OUTPATIENT
Start: 2023-10-06 | End: 2023-10-08 | Stop reason: HOSPADM

## 2023-10-06 RX ORDER — ONDANSETRON 4 MG/1
4 TABLET, ORALLY DISINTEGRATING ORAL EVERY 6 HOURS PRN
Status: DISCONTINUED | OUTPATIENT
Start: 2023-10-06 | End: 2023-10-08 | Stop reason: HOSPADM

## 2023-10-06 RX ORDER — DILTIAZEM HYDROCHLORIDE 30 MG/1
30 TABLET, FILM COATED ORAL ONCE
Status: COMPLETED | OUTPATIENT
Start: 2023-10-06 | End: 2023-10-06

## 2023-10-06 RX ORDER — SIMVASTATIN 10 MG
10 TABLET ORAL AT BEDTIME
COMMUNITY

## 2023-10-06 RX ORDER — WARFARIN SODIUM 5 MG/1
2.5-5 TABLET ORAL DAILY
Status: ON HOLD | COMMUNITY
End: 2024-02-29

## 2023-10-06 RX ORDER — OMEPRAZOLE 20 MG/1
20 TABLET, DELAYED RELEASE ORAL DAILY
COMMUNITY

## 2023-10-06 RX ORDER — NALOXONE HYDROCHLORIDE 0.4 MG/ML
0.2 INJECTION, SOLUTION INTRAMUSCULAR; INTRAVENOUS; SUBCUTANEOUS
Status: DISCONTINUED | OUTPATIENT
Start: 2023-10-06 | End: 2023-10-08 | Stop reason: HOSPADM

## 2023-10-06 RX ORDER — FLUTICASONE PROPIONATE AND SALMETEROL 250; 50 UG/1; UG/1
1 POWDER RESPIRATORY (INHALATION) EVERY 12 HOURS
COMMUNITY

## 2023-10-06 RX ORDER — GABAPENTIN 600 MG/1
1200 TABLET ORAL 3 TIMES DAILY
COMMUNITY

## 2023-10-06 RX ORDER — DILTIAZEM HYDROCHLORIDE 180 MG/1
180 CAPSULE, COATED, EXTENDED RELEASE ORAL DAILY
Status: DISCONTINUED | OUTPATIENT
Start: 2023-10-06 | End: 2023-10-08 | Stop reason: HOSPADM

## 2023-10-06 RX ORDER — FLUCONAZOLE 100 MG/1
100 TABLET ORAL
COMMUNITY

## 2023-10-06 RX ORDER — DILTIAZEM HYDROCHLORIDE 180 MG/1
180 CAPSULE, EXTENDED RELEASE ORAL DAILY
COMMUNITY

## 2023-10-06 RX ORDER — WARFARIN SODIUM 5 MG/1
5 TABLET ORAL
Status: COMPLETED | OUTPATIENT
Start: 2023-10-06 | End: 2023-10-06

## 2023-10-06 RX ORDER — SIMVASTATIN 10 MG
10 TABLET ORAL EVERY EVENING
Status: DISCONTINUED | OUTPATIENT
Start: 2023-10-06 | End: 2023-10-08 | Stop reason: HOSPADM

## 2023-10-06 RX ORDER — CEFTRIAXONE 2 G/1
2 INJECTION, POWDER, FOR SOLUTION INTRAMUSCULAR; INTRAVENOUS ONCE
Status: COMPLETED | OUTPATIENT
Start: 2023-10-06 | End: 2023-10-06

## 2023-10-06 RX ORDER — FLUCONAZOLE 100 MG/1
100 TABLET ORAL
Status: DISCONTINUED | OUTPATIENT
Start: 2023-10-06 | End: 2023-10-08 | Stop reason: HOSPADM

## 2023-10-06 RX ORDER — OMEPRAZOLE
20 KIT
Status: DISCONTINUED | OUTPATIENT
Start: 2023-10-06 | End: 2023-10-06

## 2023-10-06 RX ORDER — MONTELUKAST SODIUM 10 MG/1
10 TABLET ORAL AT BEDTIME
Status: DISCONTINUED | OUTPATIENT
Start: 2023-10-06 | End: 2023-10-08 | Stop reason: HOSPADM

## 2023-10-06 RX ORDER — GABAPENTIN 300 MG/1
600 CAPSULE ORAL 3 TIMES DAILY
Status: DISCONTINUED | OUTPATIENT
Start: 2023-10-06 | End: 2023-10-08 | Stop reason: HOSPADM

## 2023-10-06 RX ORDER — MONTELUKAST SODIUM 10 MG/1
10 TABLET ORAL AT BEDTIME
COMMUNITY

## 2023-10-06 RX ORDER — PANTOPRAZOLE SODIUM 40 MG/1
40 TABLET, DELAYED RELEASE ORAL
Status: DISCONTINUED | OUTPATIENT
Start: 2023-10-07 | End: 2023-10-08 | Stop reason: HOSPADM

## 2023-10-06 RX ORDER — ENOXAPARIN SODIUM 100 MG/ML
40 INJECTION SUBCUTANEOUS EVERY 24 HOURS
Status: DISCONTINUED | OUTPATIENT
Start: 2023-10-06 | End: 2023-10-07

## 2023-10-06 RX ORDER — ONDANSETRON 2 MG/ML
4 INJECTION INTRAMUSCULAR; INTRAVENOUS EVERY 6 HOURS PRN
Status: DISCONTINUED | OUTPATIENT
Start: 2023-10-06 | End: 2023-10-08 | Stop reason: HOSPADM

## 2023-10-06 RX ORDER — IOPAMIDOL 755 MG/ML
500 INJECTION, SOLUTION INTRAVASCULAR ONCE
Status: COMPLETED | OUTPATIENT
Start: 2023-10-06 | End: 2023-10-06

## 2023-10-06 RX ORDER — CETIRIZINE HYDROCHLORIDE 10 MG/1
10 TABLET ORAL DAILY
Status: DISCONTINUED | OUTPATIENT
Start: 2023-10-06 | End: 2023-10-08 | Stop reason: HOSPADM

## 2023-10-06 RX ORDER — DOCUSATE SODIUM 100 MG/1
100 CAPSULE, LIQUID FILLED ORAL 2 TIMES DAILY
Status: DISCONTINUED | OUTPATIENT
Start: 2023-10-06 | End: 2023-10-08 | Stop reason: HOSPADM

## 2023-10-06 RX ORDER — HYDROMORPHONE HCL IN WATER/PF 6 MG/30 ML
0.2 PATIENT CONTROLLED ANALGESIA SYRINGE INTRAVENOUS
Status: DISCONTINUED | OUTPATIENT
Start: 2023-10-06 | End: 2023-10-08 | Stop reason: HOSPADM

## 2023-10-06 RX ORDER — AMITRIPTYLINE HYDROCHLORIDE 10 MG/1
30 TABLET ORAL EVERY MORNING
COMMUNITY

## 2023-10-06 RX ORDER — ACETAMINOPHEN 650 MG/1
650 SUPPOSITORY RECTAL EVERY 6 HOURS PRN
Status: DISCONTINUED | OUTPATIENT
Start: 2023-10-06 | End: 2023-10-08 | Stop reason: HOSPADM

## 2023-10-06 RX ORDER — NALOXONE HYDROCHLORIDE 0.4 MG/ML
0.4 INJECTION, SOLUTION INTRAMUSCULAR; INTRAVENOUS; SUBCUTANEOUS
Status: DISCONTINUED | OUTPATIENT
Start: 2023-10-06 | End: 2023-10-08 | Stop reason: HOSPADM

## 2023-10-06 RX ORDER — AMOXICILLIN 250 MG
1 CAPSULE ORAL 2 TIMES DAILY PRN
Status: DISCONTINUED | OUTPATIENT
Start: 2023-10-06 | End: 2023-10-08 | Stop reason: HOSPADM

## 2023-10-06 RX ORDER — AMOXICILLIN 250 MG
2 CAPSULE ORAL 2 TIMES DAILY PRN
Status: DISCONTINUED | OUTPATIENT
Start: 2023-10-06 | End: 2023-10-08 | Stop reason: HOSPADM

## 2023-10-06 RX ORDER — AZITHROMYCIN 500 MG/5ML
500 INJECTION, POWDER, LYOPHILIZED, FOR SOLUTION INTRAVENOUS EVERY 24 HOURS
Status: DISCONTINUED | OUTPATIENT
Start: 2023-10-07 | End: 2023-10-08 | Stop reason: HOSPADM

## 2023-10-06 RX ORDER — ALBUTEROL SULFATE 0.83 MG/ML
2.5 SOLUTION RESPIRATORY (INHALATION)
Status: DISCONTINUED | OUTPATIENT
Start: 2023-10-06 | End: 2023-10-08 | Stop reason: HOSPADM

## 2023-10-06 RX ADMIN — CETIRIZINE HYDROCHLORIDE 10 MG: 10 TABLET, FILM COATED ORAL at 14:35

## 2023-10-06 RX ADMIN — GABAPENTIN 600 MG: 300 CAPSULE ORAL at 08:11

## 2023-10-06 RX ADMIN — AMITRIPTYLINE HYDROCHLORIDE 30 MG: 10 TABLET, FILM COATED ORAL at 14:35

## 2023-10-06 RX ADMIN — SODIUM CHLORIDE, POTASSIUM CHLORIDE, SODIUM LACTATE AND CALCIUM CHLORIDE: 600; 310; 30; 20 INJECTION, SOLUTION INTRAVENOUS at 18:00

## 2023-10-06 RX ADMIN — DILTIAZEM HYDROCHLORIDE 30 MG: 30 TABLET, FILM COATED ORAL at 02:26

## 2023-10-06 RX ADMIN — SODIUM CHLORIDE, POTASSIUM CHLORIDE, SODIUM LACTATE AND CALCIUM CHLORIDE 1000 ML: 600; 310; 30; 20 INJECTION, SOLUTION INTRAVENOUS at 04:39

## 2023-10-06 RX ADMIN — DOCUSATE SODIUM 100 MG: 100 CAPSULE, LIQUID FILLED ORAL at 21:15

## 2023-10-06 RX ADMIN — GABAPENTIN 600 MG: 300 CAPSULE ORAL at 21:15

## 2023-10-06 RX ADMIN — DILTIAZEM HYDROCHLORIDE 180 MG: 180 CAPSULE, COATED, EXTENDED RELEASE ORAL at 08:11

## 2023-10-06 RX ADMIN — AZITHROMYCIN MONOHYDRATE 500 MG: 500 INJECTION, POWDER, LYOPHILIZED, FOR SOLUTION INTRAVENOUS at 03:29

## 2023-10-06 RX ADMIN — IOPAMIDOL 91 ML: 755 INJECTION, SOLUTION INTRAVENOUS at 01:57

## 2023-10-06 RX ADMIN — FLUTICASONE PROPIONATE 1 SPRAY: 50 SPRAY, METERED NASAL at 14:34

## 2023-10-06 RX ADMIN — DOCUSATE SODIUM 100 MG: 100 CAPSULE, LIQUID FILLED ORAL at 08:11

## 2023-10-06 RX ADMIN — MONTELUKAST 10 MG: 10 TABLET, FILM COATED ORAL at 21:15

## 2023-10-06 RX ADMIN — SODIUM CHLORIDE, POTASSIUM CHLORIDE, SODIUM LACTATE AND CALCIUM CHLORIDE: 600; 310; 30; 20 INJECTION, SOLUTION INTRAVENOUS at 06:38

## 2023-10-06 RX ADMIN — CEFTRIAXONE 2 G: 2 INJECTION, POWDER, FOR SOLUTION INTRAMUSCULAR; INTRAVENOUS at 02:26

## 2023-10-06 RX ADMIN — ENOXAPARIN SODIUM 40 MG: 40 INJECTION SUBCUTANEOUS at 08:11

## 2023-10-06 RX ADMIN — FLUTICASONE FUROATE AND VILANTEROL TRIFENATATE 1 PUFF: 100; 25 POWDER RESPIRATORY (INHALATION) at 14:39

## 2023-10-06 RX ADMIN — GABAPENTIN 600 MG: 300 CAPSULE ORAL at 14:35

## 2023-10-06 RX ADMIN — SIMVASTATIN 10 MG: 10 TABLET, FILM COATED ORAL at 21:15

## 2023-10-06 RX ADMIN — FLUCONAZOLE 100 MG: 100 TABLET ORAL at 14:37

## 2023-10-06 RX ADMIN — SODIUM CHLORIDE 70 ML: 9 INJECTION, SOLUTION INTRAVENOUS at 01:57

## 2023-10-06 RX ADMIN — WARFARIN SODIUM 5 MG: 5 TABLET ORAL at 18:00

## 2023-10-06 ASSESSMENT — ACTIVITIES OF DAILY LIVING (ADL)
ADLS_ACUITY_SCORE: 38
ADLS_ACUITY_SCORE: 35
ADLS_ACUITY_SCORE: 24
ADLS_ACUITY_SCORE: 35
ADLS_ACUITY_SCORE: 24
ADLS_ACUITY_SCORE: 25
ADLS_ACUITY_SCORE: 38
ADLS_ACUITY_SCORE: 38
ADLS_ACUITY_SCORE: 35
ADLS_ACUITY_SCORE: 25
ADLS_ACUITY_SCORE: 38
ADLS_ACUITY_SCORE: 35

## 2023-10-06 NOTE — PLAN OF CARE
Goal Outcome Evaluation:   Patient Transfer Information  Patient connected to monitoring equipment on arrival: N/A     Patient connected to wall oxygen on arrival: N/A    Belongings: Transferred with patient    Safety check completed: Yes

## 2023-10-06 NOTE — PHARMACY-ANTICOAGULATION SERVICE
Clinical Pharmacy - Warfarin Dosing Consult     Pharmacy has been consulted to manage this patient s warfarin therapy.  Indication: Atrial Fibrillation  Therapy Goal: INR 2-3  Warfarin Prior to Admission: Yes  Warfarin PTA Regimen: Wafarin 5mg Monday, Wednesday and Friday. Warfarin 2.5mg all other days.  Significant drug interactions: Fluconazole 100mg three times weekly (but is on chronically)  Recent documented change in oral intake/nutrition: No    INR   Date Value Ref Range Status   10/06/2023 2.74 (H) 0.85 - 1.15 Final   10/05/2023 2.75 (H) 0.85 - 1.15 Final       Recommend warfarin 5 mg today, per home regimen.  Pharmacy will monitor Kaycee Fuchs daily and order warfarin doses to achieve specified goal.      Please contact pharmacy as soon as possible if the warfarin needs to be held for a procedure or if the warfarin goals change.       Josette Bella, PharmD, [facility]   Emergency Medicine Pharmacist  [phone] or [place]  October 6, 2023

## 2023-10-06 NOTE — ED NOTES
[facility]  ED Nurse Handoff Report    ED Chief complaint: Fever and Cough  . ED Diagnosis:   Final diagnoses:   Community acquired bacterial pneumonia   Atrial fibrillation with rapid ventricular response (H)       Allergies:   Allergies   Allergen Reactions    Claritin [Loratadine]     Morphine And Related     Zantac        Code Status: Full Code    Activity level - Baseline/Home:  independent.  Activity Level - Current:   independent.   Lift room needed: No.   Bariatric: No   Needed: No   Isolation: yes  Infection: Community acquired bacterial pneumonia    Respiratory status: Room air    Vital Signs (within 30 minutes):   Vitals:    10/06/23 0202 10/06/23 0212 10/06/23 0222 10/06/23 0232   BP: 100/70 104/58  99/64   Pulse:       Resp:       Temp:       TempSrc:       SpO2:  99% 96% 98%       Cardiac Rhythm:  ,   Cardiac  Cardiac Rhythm: Atrial fibrillation  Pain level:    Patient confused: No.   Patient Falls Risk: ID band, nonslip socks, items within reach  Elimination Status: Has voided     Patient Report - Initial Complaint:Fever of 101.8F at home. Coughing for 1-2 weeks. Denies SOB. Was told by pulmonology to come in should she run a fever. Was using duoneb all week with little relief. Hx asthma   Focused Assessment:   82 year old female presents with fever and cough.  Patient has a history of asthma.  She had the onset of cough resulting in an increase home neb use with transient improvement of symptoms.  She also had a prescription for prednisone which she took for 5 days and completed 4 days prior to arrival.  Unfortunately symptoms have persisted and now has development of fever up to 102.1 today.  Despite the cough and fever, she denies shortness of breath or chest pain.  No sick contacts.  She denies vomiting, diarrhea or rash.     Independent Historian:    None     Review of External Notes:  None     Medications:    ADVAIR DISKUS 100-50 MCG/DOSE IN AEPB  FLONASE 50  MCG/ACT NA SUSP  MIDRIN 325- MG PO CAPS  NAPROXEN 500 MG PO TABS  OMEPRAZOLE  ZYRTEC ALLERGY PO      Past Medical History:    Asthma  Hyperlipidemia  Chronic atrial fibrillation  Mitral regurgitation  GERD     Abnormal Results:   Labs Ordered and Resulted from Time of ED Arrival to Time of ED Departure   COMPREHENSIVE METABOLIC PANEL - Abnormal       Result Value    Sodium 134 (*)     Potassium 4.5      Carbon Dioxide (CO2) 28      Anion Gap 8      Urea Nitrogen 12.8      Creatinine 0.96 (*)     GFR Estimate 59 (*)     Calcium 9.4      Chloride 98      Glucose 116 (*)     Alkaline Phosphatase 66      AST 17      ALT 9      Protein Total 6.9      Albumin 3.8      Bilirubin Total 0.5     BLOOD GAS VENOUS WITH OXYHEMOGLOBIN - Abnormal    pH Venous 7.48 (*)     pCO2 Venous 42      pO2 Venous 43      Bicarbonate Venous 31 (*)     FIO2 21      Oxyhemoglobin Venous 81 (*)     Base Excess/Deficit 7.0 (*)    INR - Abnormal    INR 2.75 (*)    CBC WITH PLATELETS AND DIFFERENTIAL - Abnormal    WBC Count 21.1 (*)     RBC Count 4.49      Hemoglobin 14.6      Hematocrit 42.9      MCV 96      MCH 32.5      MCHC 34.0      RDW 13.1      Platelet Count 283      % Neutrophils 83      % Lymphocytes 9      % Monocytes 6      Mids % (Monos, Eos, Basos)        % Eosinophils 1      % Basophils 0      % Immature Granulocytes 1      NRBCs per 100 WBC 0      Absolute Neutrophils 17.6 (*)     Absolute Lymphocytes 2.0      Absolute Monocytes 1.2      Mids Abs (Monos, Eos, Basos)        Absolute Eosinophils 0.2      Absolute Basophils 0.1      Absolute Immature Granulocytes 0.1      Absolute NRBCs 0.0     INFLUENZA A/B, RSV, & SARS-COV2 PCR - Normal    Influenza A PCR Negative      Influenza B PCR Negative      RSV PCR Negative      SARS CoV2 PCR Negative     LACTIC ACID WHOLE BLOOD - Normal    Lactic Acid 0.9     BLOOD CULTURE   BLOOD CULTURE        CT Chest w Contrast   Final Result   IMPRESSION:    1.  Multiple nodules or nodular  infiltrates in the right lung suggest pneumonia. Follow-up to resolution recommended.   2.  Few mildly enlarged right hilar and mediastinal lymph nodes.   3.  Cholelithiasis.      Chest XR,  PA & LAT   Final Result   IMPRESSION: Cardiomediastinal silhouette within normal limits. Mild right perihilar and bibasilar atelectasis or infiltrate. Trace effusions not excluded.          Treatments provided: See MAR  Family Comments: alone  OBS brochure/video discussed/provided to patient:  N/A  ED Medications:   Medications   cefTRIAXone (ROCEPHIN) 2 g vial to attach to  ml bag for ADULTS or NS 50 ml bag for PEDS (2 g Intravenous $New Bag 10/6/23 0226)   azithromycin 500 mg (ZITHROMAX) in 0.9% NaCl 250 mL intermittent infusion 500 mg (has no administration in time range)   acetaminophen (TYLENOL) tablet 1,000 mg (1,000 mg Oral $Given 10/5/23 2326)   sodium chloride 0.9% BOLUS 500 mL (0 mLs Intravenous Stopped 10/6/23 0222)   diltiazem (CARDIZEM) injection 10 mg (10 mg Intravenous $Given 10/5/23 2327)   iopamidol (ISOVUE-370) solution 500 mL (91 mLs Intravenous $Given 10/6/23 0157)   CT scan flush (70 mLs Intravenous $Given 10/6/23 0157)   diltiazem (CARDIZEM) tablet 30 mg (30 mg Oral $Given 10/6/23 0226)       Drips infusing:  No  For the majority of the shift this patient was Green.   Interventions performed were .    Sepsis treatment initiated: No    Cares/treatment/interventions/medications to be completed following ED care:     ED Nurse Name: Annie Alonso RN   RECEIVING UNIT ED HANDOFF REVIEW    Above ED Nurse Handoff Report was reviewed: Yes   Reviewed by: Mary Jo Severance, RN on October 6, 2023 at 2:24 PM    2:36 AM

## 2023-10-06 NOTE — H&P
[facility]    History and Physical - Hospitalist Service       Date of Admission:  10/5/2023    Assessment & Plan      Kaycee Fuchs is a 82 year old female admitted on 10/5/2023.     She has history of asthma, atrial fibrillation and GERD who started having cough starting 9/27 but did not have any fever or shortness of breath.  She thought it was her asthma and took the standing 5-day course of prednisone with initial improvement of her symptoms but starting having worsening cough again since yesterday and came to ER for evaluation today as she developed fever.  She has no wheezing, chest pain or shortness of breath and appears nontoxic.    Vitals on presentation: Temperature 102.1  F, blood pressure 108/95 with heart rate of 112 (atrial fibrillation with RVR) and respiratory rate of 20 with oxygen saturation of 99.  Not needing oxygen.    Her CMP was within normal limits.  WBC count of 21,000 with lactate of 0.9.  VBG with a pH/PCO2 of 7.48/42 tested negative for COVID-19, influenza A/B and RSV.  CT chest showed multiple nodules or nodular infiltrates in the right lung suggestive of pneumonia.  EKG showed atrial fibrillation with RVR of 121.    Patient was given a dose of IV ceftriaxone and azithromycin and Cardizem 10 mg IV followed by 30 mg p.o.      Right lower lobe community-acquired  pneumonia.  Will treat with IV ceftriaxone and azithromycin.  Follow-up on blood cultures.  Repeat CT chest in 4 to 6 weeks to ensure resolution of nodular infiltrates.  As patient is slightly hypotensive, will give an LR bolus x1 L followed by LR at 75 mill per hour.    History of asthma.  Not in exacerbation.  Does not need steroids.  Can continue Singulair, Advair and as needed albuterol.    Atrial fibrillation with RVR.  Likely driven by fever due to pneumonia.  Continue Cardizem 180 mg daily.    Neuropathy.  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 3 times daily and  will continue.    GERD.  Continue Zyrtec.    Hyperlipidemia.  Continue simvastatin.    Thrush prophylaxis.  Patient is on fluconazole 100 mg p.o. 3 times daily for thrush prophylaxis due to using inhaled steroids.    Allergic rhinitis.  Continue Flonase.         Diet:  Regular diet  DVT Prophylaxis: Enoxaparin (Lovenox) SQ  Shearer Catheter: Not present  Lines: None     Cardiac Monitoring: None  Code Status:  Full code    Clinically Significant Risk Factors Present on Admission               # Coagulation Defect: INR = 2.75 (Ref range: 0.85 - 1.15) and/or PTT = N/A, will monitor for bleeding                    Disposition Plan           Mario Frederick MD  Hospitalist Service  [facility]  Securely message with [employer] (more info)  Text page via [facility] Paging/Directory     ______________________________________________________________________    Chief Complaint   Cough and fever    History is obtained from the patient    History of Present Illness   Kaycee Fuchs is a 82 year old female admitted on 10/5/2023.     She has history of asthma, atrial fibrillation and GERD who started having cough starting 9/27 but did not have any fever or shortness of breath.  She thought it was her asthma and took the standing 5-day course of prednisone with initial improvement of her symptoms but starting having worsening cough again since yesterday and came to ER for evaluation today as she developed fever.  She has no wheezing, chest pain or shortness of breath and appears nontoxic.    Vitals on presentation: Temperature 102.1  F, blood pressure 108/95 with heart rate of 112 (atrial fibrillation with RVR) and respiratory rate of 20 with oxygen saturation of 99.  Not needing oxygen.    Her CMP was within normal limits.  WBC count of 21,000 with lactate of 0.9.  VBG with a pH/PCO2 of 7.48/42 tested negative for COVID-19, influenza A/B and RSV.  CT chest showed multiple nodules or nodular infiltrates in the right  lung suggestive of pneumonia.  EKG showed atrial fibrillation with RVR of 121.    Patient was given a dose of IV ceftriaxone and azithromycin and Cardizem 10 mg IV followed by 30 mg p.o.      Past Medical History    No past medical history on file.    Past Surgical History   Past Surgical History:   Procedure Laterality Date    FOOT SURGERY      JOINT REPLACEMENT Right     hip       Prior to Admission Medications   Prior to Admission Medications   Prescriptions Last Dose Informant Patient Reported? Taking?   ADVAIR DISKUS 100-50 MCG/DOSE IN AEPB   Yes No   Si INHALATION EVERY 12 HOURS   FLONASE 50 MCG/ACT NA SUSP   Yes No   Sig: None Entered   MIDRIN 325- MG PO CAPS   No No   Sig: ONE TO TWO CAPSULES EVERY 4 HOURS AS NEEDED   NAPROXEN 500 MG PO TABS   No No   Sig: ONE TABLET TWICE DAILY WITH FOOD   OMEPRAZOLE   Yes No   Sig: None Entered   ZYRTEC ALLERGY PO   Yes No   Sig: None Entered      Facility-Administered Medications: None        Review of Systems    The 10 point Review of Systems is negative other than noted in the HPI or here.    Physical Exam   Vital Signs: Temp: (!) 102.1  F (38.9  C) Temp src: Oral BP: (!) 108/95 Pulse: 112   Resp: 20 SpO2: 99 % O2 Device: None (Room air)    Weight: 0 lbs 0 oz    General Appearance: Alert awake and oriented x3.  Respiratory: Crackles in the right lung base.  Cardiovascular: S1-S2 normal.  GI: Soft and nontender  Skin: No rash  Other: No edema    Medical Decision Making       MANAGEMENT DISCUSSED with the following over the past 24 hours: Patient and ER provider       Data     I have personally reviewed the following data over the past 24 hrs:    21.1 (H)  \   14.6   / 283     134 (L) 98 12.8 /  116 (H)   4.5 28 0.96 (H) \     ALT: 9 AST: 17 AP: 66 TBILI: 0.5   ALB: 3.8 TOT PROTEIN: 6.9 LIPASE: N/A     Procal: N/A CRP: N/A Lactic Acid: 0.9       INR:  2.75 (H) PTT:  N/A   D-dimer:  N/A Fibrinogen:  N/A       Imaging results reviewed over the past 24 hrs:    Recent Results (from the past 24 hour(s))   Chest XR,  PA & LAT    Narrative    EXAM: XR CHEST 2 VIEWS  LOCATION: [facility]  DATE: 10/6/2023    INDICATION: fever, cough  COMPARISON: None.      Impression    IMPRESSION: Cardiomediastinal silhouette within normal limits. Mild right perihilar and bibasilar atelectasis or infiltrate. Trace effusions not excluded.   CT Chest w Contrast    Narrative    EXAM: CT CHEST W CONTRAST  LOCATION: [facility]  DATE: 10/6/2023    INDICATION: fever, cough   eval for occult pneumonia  COMPARISON: None.  TECHNIQUE: CT chest with IV contrast. Multiplanar reformats were obtained. Dose reduction techniques were used.    CONTRAST: 91mL Isovue 370    FINDINGS:   LUNGS AND PLEURA: Patchy nodules or nodular infiltrates in the right upper lobe and right lower lobe. Atelectasis and scarring at the lung bases. Calcified granulomas at the lung bases bilaterally. There is no pneumothorax or pleural effusion.    MEDIASTINUM/AXILLAE: There is an enlarged right infrahilar lymph node measuring approximately 3.2 x 1.7 cm. Few additional mildly enlarged mediastinal and right hilar nodes.    CORONARY ARTERY CALCIFICATION: Mild.    UPPER ABDOMEN: Faintly calcified stone in the gallbladder. Small hiatal hernia.    MUSCULOSKELETAL: Degenerative disease throughout spine.      Impression    IMPRESSION:   1.  Multiple nodules or nodular infiltrates in the right lung suggest pneumonia. Follow-up to resolution recommended.  2.  Few mildly enlarged right hilar and mediastinal lymph nodes.  3.  Cholelithiasis.

## 2023-10-06 NOTE — ED TRIAGE NOTES
Arrived via EMS from home. Fever of 101.8F at home. Coughing for 1-2 weeks. Denies SOB. Was told by pulmonology to come in should she run a fever. Was using duoneb all week with little relief.  Hx asthma     Triage Assessment       Row Name 10/05/23 0303       Triage Assessment (Adult)    Airway WDL WDL       Respiratory WDL    Respiratory WDL X;rhythm/pattern;cough    Rhythm/Pattern, Respiratory no shortness of breath reported    Cough Frequency frequent    Cough Type congested       Skin Circulation/Temperature WDL    Skin Circulation/Temperature WDL X;temperature    Skin Temperature warm       Cardiac WDL    Cardiac WDL X;rhythm    Pulse Rate & Regularity tachycardic;apical pulse irregular    Cardiac Rhythm Atrial fibrillation       Cognitive/Neuro/Behavioral WDL    Cognitive/Neuro/Behavioral WDL WDL

## 2023-10-06 NOTE — ED PROVIDER NOTES
History     Chief Complaint:  Fever and Cough       HPI     Kaycee Fuchs is a 82 year old female presents with fever and cough.  Patient has a history of asthma.  She had the onset of cough resulting in an increase home neb use with transient improvement of symptoms.  She also had a prescription for prednisone which she took for 5 days and completed 4 days prior to arrival.  Unfortunately symptoms have persisted and now has development of fever up to 102.1 today.  Despite the cough and fever, she denies shortness of breath or chest pain.  No sick contacts.  She denies vomiting, diarrhea or rash.    Independent Historian:    None    Review of External Notes:  None    Medications:    ADVAIR DISKUS 100-50 MCG/DOSE IN AEPB  FLONASE 50 MCG/ACT NA SUSP  MIDRIN 325- MG PO CAPS  NAPROXEN 500 MG PO TABS  OMEPRAZOLE  ZYRTEC ALLERGY PO        Past Medical History:    Asthma  Hyperlipidemia  Chronic atrial fibrillation  Mitral regurgitation  GERD    Past Surgical History:    Past Surgical History:   Procedure Laterality Date    FOOT SURGERY      JOINT REPLACEMENT Right     hip          Physical Exam   Patient Vitals for the past 24 hrs:   BP Temp Temp src Pulse Resp SpO2   10/05/23 2307 (!) 108/95 (!) 102.1  F (38.9  C) Oral 112 20 99 %        Physical Exam      HEENT:    Oropharynx is moist  Eyes:    Conjunctiva normal  Neck:     Supple, no meningismus.     CV:     Tachycardic, regular rhythm     No murmurs, rubs or gallops.       No unilateral leg swelling.       2+ radial pulses bilateral.       No lower extremity edema.  PULM:    Inspiratory rales at right > left base     No respiratory distress although tachypneic.      Good air exchange.     No wheezing.     No stridor.  ABD:    Soft, non-tender, non-distended.       No pulsatile masses.       No rebound, guarding or rigidity.  MSK:     No gross deformity to all four extremities.   LYMPH:   No cervical lymphadenopathy.  NEURO:   Alert. Good muscle tone, no  atrophy.  Skin:    Hot, dry and intact.    Psych:    Mood is good and affect is appropriate.      Emergency Department Course   ECG  ECG results from 10/05/23   EKG 12-lead, tracing only     Value    Systolic Blood Pressure     Diastolic Blood Pressure     Ventricular Rate 121    Atrial Rate     CA Interval     QRS Duration 72        QTc 457    P Axis     R AXIS 58    T Axis 86    Interpretation ECG      Atrial fibrillation with rapid ventricular response  Nonspecific ST and T wave abnormality  Abnormal ECG  No previous ECGs available           Imaging:  CT Chest w Contrast   Final Result   IMPRESSION:    1.  Multiple nodules or nodular infiltrates in the right lung suggest pneumonia. Follow-up to resolution recommended.   2.  Few mildly enlarged right hilar and mediastinal lymph nodes.   3.  Cholelithiasis.      Chest XR,  PA & LAT   Final Result   IMPRESSION: Cardiomediastinal silhouette within normal limits. Mild right perihilar and bibasilar atelectasis or infiltrate. Trace effusions not excluded.        Report per radiology    Laboratory:  Labs Ordered and Resulted from Time of ED Arrival to Time of ED Departure   COMPREHENSIVE METABOLIC PANEL - Abnormal       Result Value    Sodium 134 (*)     Potassium 4.5      Carbon Dioxide (CO2) 28      Anion Gap 8      Urea Nitrogen 12.8      Creatinine 0.96 (*)     GFR Estimate 59 (*)     Calcium 9.4      Chloride 98      Glucose 116 (*)     Alkaline Phosphatase 66      AST 17      ALT 9      Protein Total 6.9      Albumin 3.8      Bilirubin Total 0.5     BLOOD GAS VENOUS WITH OXYHEMOGLOBIN - Abnormal    pH Venous 7.48 (*)     pCO2 Venous 42      pO2 Venous 43      Bicarbonate Venous 31 (*)     FIO2 21      Oxyhemoglobin Venous 81 (*)     Base Excess/Deficit 7.0 (*)    INR - Abnormal    INR 2.75 (*)    CBC WITH PLATELETS AND DIFFERENTIAL - Abnormal    WBC Count 21.1 (*)     RBC Count 4.49      Hemoglobin 14.6      Hematocrit 42.9      MCV 96      MCH 32.5      MCHC  34.0      RDW 13.1      Platelet Count 283      % Neutrophils 83      % Lymphocytes 9      % Monocytes 6      Mids % (Monos, Eos, Basos)        % Eosinophils 1      % Basophils 0      % Immature Granulocytes 1      NRBCs per 100 WBC 0      Absolute Neutrophils 17.6 (*)     Absolute Lymphocytes 2.0      Absolute Monocytes 1.2      Mids Abs (Monos, Eos, Basos)        Absolute Eosinophils 0.2      Absolute Basophils 0.1      Absolute Immature Granulocytes 0.1      Absolute NRBCs 0.0     INFLUENZA A/B, RSV, & SARS-COV2 PCR - Normal    Influenza A PCR Negative      Influenza B PCR Negative      RSV PCR Negative      SARS CoV2 PCR Negative     LACTIC ACID WHOLE BLOOD - Normal    Lactic Acid 0.9     BLOOD CULTURE   BLOOD CULTURE          Emergency Department Course & Assessments:      Interventions:  Medications   cefTRIAXone (ROCEPHIN) 2 g vial to attach to  ml bag for ADULTS or NS 50 ml bag for PEDS (has no administration in time range)   azithromycin 500 mg (ZITHROMAX) in 0.9% NaCl 250 mL intermittent infusion 500 mg (has no administration in time range)   diltiazem (CARDIZEM) tablet 30 mg (has no administration in time range)   acetaminophen (TYLENOL) tablet 1,000 mg (1,000 mg Oral $Given 10/5/23 2326)   sodium chloride 0.9% BOLUS 500 mL (500 mLs Intravenous $New Bag 10/5/23 5854)   diltiazem (CARDIZEM) injection 10 mg (10 mg Intravenous $Given 10/5/23 3257)   iopamidol (ISOVUE-370) solution 500 mL (91 mLs Intravenous $Given 10/6/23 0157)   CT scan flush (70 mLs Intravenous $Given 10/6/23 0157)        Independent Interpretation (X-rays, CTs, rhythm strip):  I independently reviewed chest x-ray which there is no pneumothorax or dense focal infiltrate    Consultations/Discussion of Management or Tests:  Dr. Frederick       Social Determinants of Health affecting care:  None     Disposition:  The patient was admitted to the hospital under the care of Dr. Frederick.     Impression & Plan        Medical Decision  Makin-year-old female with history of asthma presents with cough and developing a fever.  Despite her history of asthma, she has no evidence of acute bronchospasm.  Viral swabs are negative.  Patient without leukocytosis although may be contributed by recent prednisone use.  Chest x-ray was unremarkable.  I was suspicious for occult pneumonia.  CT scan of the chest performed revealing right lower lobe infiltrate.  Patient given ceftriaxone and azithromycin and will be transferred to a inpatient bed.    Patient also has a history of chronic atrial fibrillation anticoagulated on warfarin.  Patient was in A-fib with rapid ventricular sponsor at 140-160.  This was in part likely driven by fever.  With a single dose of diltiazem and antipyretics, heart rate now .  Patient given additional dose of oral diltiazem    Diagnosis:    ICD-10-CM    1. Community acquired bacterial pneumonia  J15.9       2. Atrial fibrillation with rapid ventricular response (H)  I48.91            Discharge Medications:  New Prescriptions    No medications on file          10/6/2023   Faizan Worley MD Matthews, Jeremiah R, MD  10/06/23 0226

## 2023-10-06 NOTE — PROGRESS NOTES
Admitted earlier today.  H&P reviewed.  Case discussed with nursing service.  Seen and examined and I met this pleasant lady while she is boarding in the emergency room waiting for her hospitalization bed.  No ongoing issues this morning.  Still having intermittent coughing spells but able to tolerate oral diet.  Currently not requiring oxygen support.  Denies any worsening sensation or shortness of breath.  No reported chest pain, nausea, vomiting.  No reported abdominal pain.  She is not hypoxic.  Afebrile.    Mental state remain at baseline.  She lives at home with her  and daughter.  No other individuals currently had similar symptomatology.  No issues with tachyarrhythmias.  Not needing corticosteroids as not demonstrating any wheezing earlier.  Remain on IV antibiotics as started during admission for this underlying issue of community-acquired pneumonia suspected bacterial etiology right lower lung.  Agree with outlined plan by admitting service.    [place]

## 2023-10-06 NOTE — PHARMACY-ADMISSION MEDICATION HISTORY
Pharmacist Admission Medication History    Admission medication history is complete. The information provided in this note is only as accurate as the sources available at the time of the update.    Information Source(s): Patient via in-person    Pertinent Information: use [facility] for chronic meds, [facility] in [place] on discharge.    Changes made to PTA medication list:  Added: amitriptyline, fluconazole, gabapentin, singulair, zocor, diltiazem, warfarin  Deleted: midrin, naproxen (old Rx from 2010)  Changed: advair, flonase, omeprazole, zyrtec    Medication Affordability:  Not including over the counter (OTC) medications, was there a time in the past 3 months when you did not take your medications as prescribed because of cost?: No    Allergies reviewed with patient and updates made in EHR: yes    Medication History Completed By: Karly Dover [facility] 10/6/2023 10:58 AM    PTA Med List   Medication Sig Last Dose    amitriptyline (ELAVIL) 10 MG tablet Take 30 mg by mouth every morning 10/5/2023    diltiazem ER (DILT-XR) 180 MG 24 hr capsule Take 180 mg by mouth daily 10/5/2023 at am    FLONASE 50 MCG/ACT NA SUSP Spray 1 spray into both nostrils 2 times daily 10/5/2023    fluconazole (DIFLUCAN) 100 MG tablet Take 100 mg by mouth three times a week Sun, Wed, Fri 10/4/2023    fluticasone-salmeterol (ADVAIR) 250-50 MCG/ACT inhaler Inhale 1 puff into the lungs every 12 hours 10/5/2023    gabapentin (NEURONTIN) 600 MG tablet Take 1,200 mg by mouth 3 times daily 10/5/2023    montelukast (SINGULAIR) 10 MG tablet Take 10 mg by mouth at bedtime 10/4/2023    omeprazole (PRILOSEC OTC) 20 MG EC tablet Take 20 mg by mouth daily 10/5/2023    simvastatin (ZOCOR) 10 MG tablet Take 10 mg by mouth at bedtime 10/4/2023    warfarin ANTICOAGULANT (COUMADIN) 5 MG tablet Take 2.5-5 mg by mouth daily 5mg MWF, 2.5mg rest of the week 10/4/2023 at 5mg (Wed)    ZYRTEC ALLERGY PO Take 10 mg by mouth daily 10/5/2023

## 2023-10-07 LAB
HOLD SPECIMEN: NORMAL
INR PPP: 2.37 (ref 0.85–1.15)
MAGNESIUM SERPL-MCNC: 1.9 MG/DL (ref 1.7–2.3)
POTASSIUM SERPL-SCNC: 4.1 MMOL/L (ref 3.4–5.3)

## 2023-10-07 PROCEDURE — 83735 ASSAY OF MAGNESIUM: CPT | Performed by: INTERNAL MEDICINE

## 2023-10-07 PROCEDURE — 36415 COLL VENOUS BLD VENIPUNCTURE: CPT | Performed by: STUDENT IN AN ORGANIZED HEALTH CARE EDUCATION/TRAINING PROGRAM

## 2023-10-07 PROCEDURE — 99233 SBSQ HOSP IP/OBS HIGH 50: CPT | Performed by: INTERNAL MEDICINE

## 2023-10-07 PROCEDURE — 258N000003 HC RX IP 258 OP 636: Performed by: STUDENT IN AN ORGANIZED HEALTH CARE EDUCATION/TRAINING PROGRAM

## 2023-10-07 PROCEDURE — 250N000011 HC RX IP 250 OP 636: Performed by: STUDENT IN AN ORGANIZED HEALTH CARE EDUCATION/TRAINING PROGRAM

## 2023-10-07 PROCEDURE — 250N000013 HC RX MED GY IP 250 OP 250 PS 637: Performed by: INTERNAL MEDICINE

## 2023-10-07 PROCEDURE — 85610 PROTHROMBIN TIME: CPT | Performed by: STUDENT IN AN ORGANIZED HEALTH CARE EDUCATION/TRAINING PROGRAM

## 2023-10-07 PROCEDURE — 120N000001 HC R&B MED SURG/OB

## 2023-10-07 PROCEDURE — 250N000013 HC RX MED GY IP 250 OP 250 PS 637: Performed by: STUDENT IN AN ORGANIZED HEALTH CARE EDUCATION/TRAINING PROGRAM

## 2023-10-07 PROCEDURE — 36415 COLL VENOUS BLD VENIPUNCTURE: CPT | Performed by: INTERNAL MEDICINE

## 2023-10-07 PROCEDURE — 84132 ASSAY OF SERUM POTASSIUM: CPT | Performed by: INTERNAL MEDICINE

## 2023-10-07 RX ORDER — WARFARIN SODIUM 2.5 MG/1
2.5 TABLET ORAL
Status: COMPLETED | OUTPATIENT
Start: 2023-10-07 | End: 2023-10-07

## 2023-10-07 RX ADMIN — CETIRIZINE HYDROCHLORIDE 10 MG: 10 TABLET, FILM COATED ORAL at 08:15

## 2023-10-07 RX ADMIN — SODIUM CHLORIDE, POTASSIUM CHLORIDE, SODIUM LACTATE AND CALCIUM CHLORIDE: 600; 310; 30; 20 INJECTION, SOLUTION INTRAVENOUS at 12:30

## 2023-10-07 RX ADMIN — MONTELUKAST 10 MG: 10 TABLET, FILM COATED ORAL at 21:11

## 2023-10-07 RX ADMIN — SIMVASTATIN 10 MG: 10 TABLET, FILM COATED ORAL at 21:11

## 2023-10-07 RX ADMIN — ENOXAPARIN SODIUM 40 MG: 40 INJECTION SUBCUTANEOUS at 08:15

## 2023-10-07 RX ADMIN — HYDROMORPHONE HYDROCHLORIDE 1 MG: 2 TABLET ORAL at 17:17

## 2023-10-07 RX ADMIN — DILTIAZEM HYDROCHLORIDE 180 MG: 180 CAPSULE, COATED, EXTENDED RELEASE ORAL at 08:14

## 2023-10-07 RX ADMIN — GABAPENTIN 600 MG: 300 CAPSULE ORAL at 14:55

## 2023-10-07 RX ADMIN — WARFARIN SODIUM 2.5 MG: 2.5 TABLET ORAL at 17:16

## 2023-10-07 RX ADMIN — AMITRIPTYLINE HYDROCHLORIDE 30 MG: 10 TABLET, FILM COATED ORAL at 08:15

## 2023-10-07 RX ADMIN — GABAPENTIN 600 MG: 300 CAPSULE ORAL at 08:14

## 2023-10-07 RX ADMIN — GABAPENTIN 600 MG: 300 CAPSULE ORAL at 21:11

## 2023-10-07 RX ADMIN — AZITHROMYCIN MONOHYDRATE 500 MG: 500 INJECTION, POWDER, LYOPHILIZED, FOR SOLUTION INTRAVENOUS at 03:54

## 2023-10-07 RX ADMIN — DOCUSATE SODIUM 100 MG: 100 CAPSULE, LIQUID FILLED ORAL at 21:11

## 2023-10-07 RX ADMIN — PANTOPRAZOLE SODIUM 40 MG: 40 TABLET, DELAYED RELEASE ORAL at 08:15

## 2023-10-07 RX ADMIN — FLUTICASONE PROPIONATE 1 SPRAY: 50 SPRAY, METERED NASAL at 08:18

## 2023-10-07 RX ADMIN — CEFTRIAXONE 1 G: 1 INJECTION, POWDER, FOR SOLUTION INTRAMUSCULAR; INTRAVENOUS at 01:37

## 2023-10-07 RX ADMIN — DOCUSATE SODIUM 100 MG: 100 CAPSULE, LIQUID FILLED ORAL at 08:14

## 2023-10-07 ASSESSMENT — ACTIVITIES OF DAILY LIVING (ADL)
ADLS_ACUITY_SCORE: 24
ADLS_ACUITY_SCORE: 22
ADLS_ACUITY_SCORE: 24
ADLS_ACUITY_SCORE: 22
ADLS_ACUITY_SCORE: 22
ADLS_ACUITY_SCORE: 24
ADLS_ACUITY_SCORE: 22
ADLS_ACUITY_SCORE: 22
ADLS_ACUITY_SCORE: 24
ADLS_ACUITY_SCORE: 22

## 2023-10-07 NOTE — PROGRESS NOTES
[facility]    Hospitalist Progress Note  Name: Kaycee Fuchs    MRN: [id number]  Provider:  Enmanuel Lopez DO  Date of Service: 10/07/2023    Summary of Stay: Kaycee Fuchs is a 82 year old female with a history of asthma, atrial fibrillation on warfarin, GERD, neuropathy, hyperlipidemia, allergic rhinitis admitted on 10/5/2023 with cough and fever.  In the emergency department, the patient was found to have a troponin of 2.1 , blood pressure 108/95, heart rate 112, respiratory rate 20, SPO2 99% on room air.  Initial lab work showed sodium 134, BUN/creatinine 20/0.86, lactic acid 0.9, glucose 116, leukocytosis of 21.1, INR 2.75.  COVID, RSV, influenza were negative.  Chest x-ray showed mild right perihilar and bibasilar atelectasis or infiltrate.  CT chest showed multiple nodules or nodular infiltrates in the right lung suggestive of pneumonia, few mildly enlarged right hilar and mediastinal lymph nodes, cholelithiasis.  EKG showed atrial fibrillation with rapid ventricular rate.  The patient was started on ceftriaxone and azithromycin for the treatment of community-acquired pneumonia with sepsis.    TODAY'S PLAN:  Pt required O2 overnight last night.  Will keep for an additional night to monitor O2.  Continue ceftriaxone and azithromycin for pneumonia.  Anticipate discharge home tomorrow.    Problem List:   Acute Hypoxic Respiratory Failure  Community Acquired Pneumonia with Sepsis  - Temp 102.1F, , LA 0.9  - IV ceftriaxone and azithromycin  - Wean O2 as able    Atrial Fibrillation with RVR  Warfarin Coagulopathy  - Continue warfarin and diltiazem    Chronic Medical Problems:  Neuropathy  GERD  Hyperlipidemia  Allergic Rhinitis  Hx of Asthma  Obesity - BMI 30.44    I spent 43 minutes in reviewing this patient's labs, imaging, medications, medical history.  In addition time was spent interviewing the patient, communicating with family, and medical decision making.     DVT  Prophylaxis: Enoxaparin (Lovenox) SQ  Code Status: Full Code  Diet: Combination Diet Regular Diet Adult    Shearer Catheter: Not present  Disposition: Expected discharge tomorrow to home. Goals prior to discharge include oxygen requirements improved, symptoms improving.   Family updated today: No     Interval History   Pt seen and examined.  Pt denies any sob, cp. +coughing.    -Data reviewed today: I personally reviewed all new labs and imaging results over the last 24 hours.     Physical Exam   Temp: 98  F (36.7  C) Temp src: Oral BP: 114/75 Pulse: 84   Resp: 18 SpO2: 96 % O2 Device: None (Room air)    Vitals:    10/06/23 1524   Weight: 85.5 kg (188 lb 9.6 oz)     Vital Signs with Ranges  Temp:  [98  F (36.7  C)-99.2  F (37.3  C)] 98  F (36.7  C)  Pulse:  [] 84  Resp:  [15-18] 18  BP: (114-134)/(57-81) 114/75  SpO2:  [92 %-97 %] 96 %  I/O last 3 completed shifts:  In: 1032 [P.O.:180; I.V.:852]  Out: 1650 [Urine:1650]    GENERAL: No apparent distress. Awake, alert, and fully oriented.  HEENT: Normocephalic, atraumatic. Extraocular movements intact.  CARDIOVASCULAR: Regular rate and rhythm without murmurs or rubs. No S3.  PULMONARY: Clear bilaterally.  GASTROINTESTINAL: Soft, non-tender, non-distended. Bowel sounds normoactive.   EXTREMITIES: No cyanosis or clubbing. No edema.  NEUROLOGICAL: CN 2-12 grossly intact, no focal neurological deficits.  DERMATOLOGICAL: No rash, ulcer, bruising, nor jaundice.    Medications      amitriptyline  30 mg Oral QAM    azithromycin  500 mg Intravenous Q24H    cefTRIAXone  1 g Intravenous Q24H    cetirizine (zyrTEC) tablet 10 mg  10 mg Oral Daily    diltiazem ER COATED BEADS  180 mg Oral Daily    docusate sodium  100 mg Oral BID    enoxaparin ANTICOAGULANT  40 mg Subcutaneous Q24H    fluconazole  100 mg Oral Once per day on Sun Wed Fri    fluticasone  1 spray Both Nostrils Daily    fluticasone-vilanterol  1 puff Inhalation Daily    gabapentin  600 mg Oral TID    montelukast  10  mg Oral At Bedtime    pantoprazole  40 mg Oral QAM AC    simvastatin  10 mg Oral QPM    Warfarin Therapy Reminder  1 each Oral See Admin Instructions     Data     Laboratory:  Recent Labs   Lab 10/06/23  0828 10/05/23  2329   WBC 15.8* 21.1*   HGB 13.3 14.6   HCT 40.6 42.9    96    283     Recent Labs   Lab 10/07/23  0831 10/06/23  1231 10/06/23  0828 10/05/23  2329   NA  --   --  137 134*   POTASSIUM 4.1  --  4.1 4.5   CHLORIDE  --   --  102 98   CO2  --   --  28 28   ANIONGAP  --   --  7 8   GLC  --  115* 92 116*   BUN  --   --  13.0 12.8   CR  --   --  0.96* 0.96*   GFRESTIMATED  --   --  59* 59*   MELISSA  --   --  8.6* 9.4     No results for input(s): CULT in the last 168 hours.    Imaging:  No results found for this or any previous visit (from the past 24 hour(s)).      Enmanuel Lopez DO  [facility] Hospitalist  [address] [place] [address]  Securely message with [employer] (more info)  Text page via [employer] Paging/Directory   10/07/2023

## 2023-10-07 NOTE — PROGRESS NOTES
A&OX4. VSS . Pt having dry and intermittent cough . Decreased air entry and crackles on the right posterior lung field . She was frequently desaturating during sleep and put on 2 L of oxygen via NC , tolerated well .    Tele : Nancy

## 2023-10-07 NOTE — PLAN OF CARE
Goal Outcome Evaluation:      Plan of Care Reviewed With: patient    Overall Patient Progress: no change    A&OX4. LS clear. No wheezing auscultated.  Slight dyspnea on exertion. VSS. Oxygen 94% on 2L NC. Afebrile. LR infusing at 75Ml/hr. On Abx Rocephin and Zithromax Q24  hrs. Denies any chest pain/N/V. Reg diet. Up with SBA to bathroom with gait belt. Tele

## 2023-10-07 NOTE — PROGRESS NOTES
[facility] - Progress Note  [facility] Med Surg.    Referral Source: Admission request.    Present: Pt was alone in her Room.    Assessment/Intervention: Pt named her  and her daughter as supportive people on her life.Pt reported that she is affiliated with [employer].I provided devotional reading. Pt welcomed prayer.    Plan: I and other chaplains remain available as needed.    Nirmal Hernandez, [facility]    Intern    [facility] routine referrals *[phone]  [facility] available 24/7 for emergent requests/referrals, either by paging the on-call  or by entering an ASAP/STAT consult in Epic (this will also page the on-call ).

## 2023-10-07 NOTE — PLAN OF CARE
"Goal Outcome Evaluation:      Plan of Care Reviewed With: patient    Overall Patient Progress: no changeOverall Patient Progress: no change    Outcome Evaluation: .      Vss, no co pain/cp/sob. Tele AFIB CVR.  IVF dc'd, K+/Mag WDL with rechecks ordered for am, INR 2.37 with plans to stop lovenox and restart warfarin tonight. Alarms on for safety, up with SBA, last BM Wednesday per pt report, weaned off O2, slept on and off entire shift. Continue poc and monitoring.       Problem: Plan of Care - These are the overarching goals to be used throughout the patient stay.    Goal: Plan of Care Review  Description: The Plan of Care Review/Shift note should be completed every shift.  The Outcome Evaluation is a brief statement about your assessment that the patient is improving, declining, or no change.  This information will be displayed automatically on your shift note.  Outcome: Not Progressing  Flowsheets (Taken 10/7/2023 4406)  Outcome Evaluation: .  Plan of Care Reviewed With: patient  Overall Patient Progress: no change  Goal: Patient-Specific Goal (Individualized)  Description: You can add care plan individualizations to a care plan. Examples of Individualization might be:  \"Parent requests to be called daily at 9am for status\", \"I have a hard time hearing out of my right ear\", or \"Do not touch me to wake me up as it startles me\".  Outcome: Not Progressing  Goal: Absence of Hospital-Acquired Illness or Injury  Outcome: Not Progressing  Intervention: Identify and Manage Fall Risk  Recent Flowsheet Documentation  Taken 10/7/2023 1435 by Annetta Edward, RN  Safety Promotion/Fall Prevention: safety round/check completed  Taken 10/7/2023 1323 by Annetta Edward, RN  Safety Promotion/Fall Prevention: safety round/check completed  Taken 10/7/2023 1211 by Annetta Edward, RN  Safety Promotion/Fall Prevention: safety round/check completed  Taken 10/7/2023 1151 by Annetta Edward, RN  Safety Promotion/Fall Prevention: " safety round/check completed  Taken 10/7/2023 1041 by Annetta Edward RN  Safety Promotion/Fall Prevention: safety round/check completed  Taken 10/7/2023 0932 by Annetta Edward RN  Safety Promotion/Fall Prevention: safety round/check completed  Taken 10/7/2023 0819 by Annetta Edward RN  Safety Promotion/Fall Prevention:   activity supervised   assistive device/personal items within reach   treat underlying cause   treat reversible contributory factors   supervised activity   safety round/check completed   room organization consistent   room near nurse's station   room door open   patient and family education   nonskid shoes/slippers when out of bed   lighting adjusted   increase visualization of patient   increased rounding and observation   clutter free environment maintained  Taken 10/7/2023 0730 by Annetta Edward RN  Safety Promotion/Fall Prevention: safety round/check completed  Intervention: Prevent Skin Injury  Recent Flowsheet Documentation  Taken 10/7/2023 0819 by Annetta Edward RN  Body Position: position changed independently  Goal: Optimal Comfort and Wellbeing  Outcome: Not Progressing  Goal: Readiness for Transition of Care  Outcome: Not Progressing     Problem: Pneumonia  Goal: Fluid Balance  Outcome: Not Progressing  Goal: Resolution of Infection Signs and Symptoms  Outcome: Not Progressing  Goal: Effective Oxygenation and Ventilation  Outcome: Not Progressing  Intervention: Promote Airway Secretion Clearance  Recent Flowsheet Documentation  Taken 10/7/2023 0819 by Annetta Edward RN  Cough And Deep Breathing: done independently per patient

## 2023-10-08 VITALS
HEIGHT: 66 IN | RESPIRATION RATE: 20 BRPM | TEMPERATURE: 98.2 F | WEIGHT: 188.6 LBS | SYSTOLIC BLOOD PRESSURE: 128 MMHG | OXYGEN SATURATION: 94 % | DIASTOLIC BLOOD PRESSURE: 77 MMHG | BODY MASS INDEX: 30.31 KG/M2 | HEART RATE: 96 BPM

## 2023-10-08 LAB
HOLD SPECIMEN: NORMAL
INR PPP: 2.35 (ref 0.85–1.15)
MAGNESIUM SERPL-MCNC: 2 MG/DL (ref 1.7–2.3)
POTASSIUM SERPL-SCNC: 4 MMOL/L (ref 3.4–5.3)

## 2023-10-08 PROCEDURE — 258N000003 HC RX IP 258 OP 636: Performed by: STUDENT IN AN ORGANIZED HEALTH CARE EDUCATION/TRAINING PROGRAM

## 2023-10-08 PROCEDURE — 99239 HOSP IP/OBS DSCHRG MGMT >30: CPT | Performed by: INTERNAL MEDICINE

## 2023-10-08 PROCEDURE — 99207 PR NO BILLABLE SERVICE THIS VISIT: CPT | Performed by: INTERNAL MEDICINE

## 2023-10-08 PROCEDURE — 85610 PROTHROMBIN TIME: CPT | Performed by: STUDENT IN AN ORGANIZED HEALTH CARE EDUCATION/TRAINING PROGRAM

## 2023-10-08 PROCEDURE — 83735 ASSAY OF MAGNESIUM: CPT | Performed by: INTERNAL MEDICINE

## 2023-10-08 PROCEDURE — 250N000013 HC RX MED GY IP 250 OP 250 PS 637: Performed by: STUDENT IN AN ORGANIZED HEALTH CARE EDUCATION/TRAINING PROGRAM

## 2023-10-08 PROCEDURE — 250N000013 HC RX MED GY IP 250 OP 250 PS 637: Performed by: INTERNAL MEDICINE

## 2023-10-08 PROCEDURE — 36415 COLL VENOUS BLD VENIPUNCTURE: CPT | Performed by: INTERNAL MEDICINE

## 2023-10-08 PROCEDURE — 250N000011 HC RX IP 250 OP 636: Mod: JZ | Performed by: STUDENT IN AN ORGANIZED HEALTH CARE EDUCATION/TRAINING PROGRAM

## 2023-10-08 PROCEDURE — 84132 ASSAY OF SERUM POTASSIUM: CPT | Performed by: INTERNAL MEDICINE

## 2023-10-08 RX ORDER — AZITHROMYCIN 250 MG/1
250 TABLET, FILM COATED ORAL DAILY
Qty: 2 TABLET | Refills: 0 | Status: SHIPPED | OUTPATIENT
Start: 2023-10-09 | End: 2023-10-11

## 2023-10-08 RX ORDER — WARFARIN SODIUM 2.5 MG/1
2.5 TABLET ORAL
Status: DISCONTINUED | OUTPATIENT
Start: 2023-10-08 | End: 2023-10-08 | Stop reason: HOSPADM

## 2023-10-08 RX ADMIN — AMITRIPTYLINE HYDROCHLORIDE 30 MG: 10 TABLET, FILM COATED ORAL at 08:32

## 2023-10-08 RX ADMIN — FLUTICASONE FUROATE AND VILANTEROL TRIFENATATE 1 PUFF: 100; 25 POWDER RESPIRATORY (INHALATION) at 08:34

## 2023-10-08 RX ADMIN — AZITHROMYCIN MONOHYDRATE 500 MG: 500 INJECTION, POWDER, LYOPHILIZED, FOR SOLUTION INTRAVENOUS at 03:22

## 2023-10-08 RX ADMIN — FLUCONAZOLE 100 MG: 100 TABLET ORAL at 08:39

## 2023-10-08 RX ADMIN — HYDROMORPHONE HYDROCHLORIDE 1 MG: 2 TABLET ORAL at 08:39

## 2023-10-08 RX ADMIN — DOCUSATE SODIUM 100 MG: 100 CAPSULE, LIQUID FILLED ORAL at 08:32

## 2023-10-08 RX ADMIN — CEFTRIAXONE 1 G: 1 INJECTION, POWDER, FOR SOLUTION INTRAMUSCULAR; INTRAVENOUS at 02:19

## 2023-10-08 RX ADMIN — CETIRIZINE HYDROCHLORIDE 10 MG: 10 TABLET, FILM COATED ORAL at 08:32

## 2023-10-08 RX ADMIN — DILTIAZEM HYDROCHLORIDE 180 MG: 180 CAPSULE, COATED, EXTENDED RELEASE ORAL at 08:32

## 2023-10-08 RX ADMIN — GABAPENTIN 600 MG: 300 CAPSULE ORAL at 08:32

## 2023-10-08 RX ADMIN — PANTOPRAZOLE SODIUM 40 MG: 40 TABLET, DELAYED RELEASE ORAL at 06:42

## 2023-10-08 RX ADMIN — FLUTICASONE PROPIONATE 1 SPRAY: 50 SPRAY, METERED NASAL at 08:34

## 2023-10-08 ASSESSMENT — ACTIVITIES OF DAILY LIVING (ADL)
ADLS_ACUITY_SCORE: 22

## 2023-10-08 NOTE — PHARMACY-ANTICOAGULATION SERVICE
Clinical Pharmacy- Warfarin Discharge Note  This patient is currently on warfarin for the treatment of Atrial fibrillation.  INR Goal= 2-3  Expected length of therapy undetermined.    Warfarin PTA Regimen: Wafarin 5mg Monday, Wednesday and Friday. Warfarin 2.5mg all other days.      Anticoagulation Dose History  More data exists         Latest Ref Rng & Units 7/18/2023 8/8/2023 9/7/2023 10/5/2023 10/6/2023 10/7/2023 10/8/2023   Recent Dosing and Labs   warfarin ANTICOAGULANT (COUMADIN) tablet 2.5 mg - - - - - - 2.5 mg, $Given -   warfarin ANTICOAGULANT (COUMADIN) tablet 5 mg - - - - - 5 mg, $Given - -   INR 0.85 - 1.15 1.9  3.0  3.0  2.75  2.74  2.37  2.35        Vitamin K doses administered during the last 7 days: n/a  FFP administered during the last 7 days: n/a    AGREE with  discharging the patient on a warfarin regimen of 5 mg MWF, 2.5 mg all other days of the week (PTA regimen) with a prescription for warfarin 5mg tablets.      The patient should have an INR checked in 5-7 days.

## 2023-10-08 NOTE — PLAN OF CARE
Goal Outcome Evaluation:      Plan of Care Reviewed With: patient    Overall Patient Progress: improving    A&OX4. LS clear. VSS. Oxygen 94% on room air. Afebrile. Continued on IV Rocephin and Zithromax.Q24 hrs. Up with SBA to bathroom.  Denied sob and pain. Continue POC and monitoring.

## 2023-10-08 NOTE — PROGRESS NOTES
"/77 (BP Location: Left arm)   Pulse 96   Temp 98.2  F (36.8  C) (Oral)   Resp 20   Ht 1.676 m (5' 6\")   Wt 85.5 kg (188 lb 9.6 oz)   SpO2 94%   BMI 30.44 kg/m        A&O. Up SBA. Tele is Afib VVR. On RA. C/o chronic right shoulder pain, PRN PO Dilaudid given with relief. Will discharge home with PO Zithro and Amoxicillin for PNA.        AVS reviewed with patient. Patient is in stable condition, VSS, no co pain/cp/sob. All discharge education reviewed with pt in regards to: diet, activity, safety, s/s to report, medications and rx, follow up appointments/care.  All questions answered. Pt denies any further questions or concerns. PIV removed. No complications. Telemetry monitor removed. All belongings returned. Pt escorted to front door by [place] staff.     "

## 2023-10-08 NOTE — DISCHARGE SUMMARY
Hospitalist Discharge Summary  [facility]    Kaycee Fuchs MRN# [id number]   YOB: 1940 Age: 82 year old     Date of Admission:  10/5/2023  Date of Discharge:  10/8/2023 12:08 PM  Admitting Physician:  Mario Frederick MD  Discharge Physician:  Enmanuel Lopez DO  Discharging Service:  Hospitalist     Primary Provider: Natalya Matthews          Discharge Diagnosis:     Acute Hypoxic Respiratory Failure  Community Acquired Pneumonia with Sepsis  - Temp 102.1F, , LA 0.9  - IV ceftriaxone and azithromycin  - Wean O2 as able     Atrial Fibrillation with RVR  Warfarin Coagulopathy  - Continue warfarin and diltiazem     Chronic Medical Problems:  Neuropathy  GERD  Hyperlipidemia  Allergic Rhinitis  Hx of Asthma  Obesity - BMI 30.44             Discharge Disposition:     Discharged to home           Hospital Course:     Kaycee Fuchs is a 82 year old female with a history of asthma, atrial fibrillation on warfarin, GERD, neuropathy, hyperlipidemia, allergic rhinitis admitted on 10/5/2023 with cough and fever.  In the emergency department, the patient was found to have a troponin of 2.1 , blood pressure 108/95, heart rate 112, respiratory rate 20, SPO2 99% on room air.  Initial lab work showed sodium 134, BUN/creatinine 20/0.86, lactic acid 0.9, glucose 116, leukocytosis of 21.1, INR 2.75.  COVID, RSV, influenza were negative.  Chest x-ray showed mild right perihilar and bibasilar atelectasis or infiltrate.  CT chest showed multiple nodules or nodular infiltrates in the right lung suggestive of pneumonia, few mildly enlarged right hilar and mediastinal lymph nodes, cholelithiasis.  EKG showed atrial fibrillation with rapid ventricular rate.  The patient was started on ceftriaxone and azithromycin for the treatment of community-acquired pneumonia with sepsis.  The patient's oxygen was weaned.  On 10/8/2023, the patient was discharged home.     The patient was seen, examined, and  "counseled on this day. The hospitalization and plan of care was reviewed with the patient extensively. All questions were addressed and the patient agreed to follow-up as noted above.           Allergies:     Allergies   Allergen Reactions    Claritin [Loratadine]      Arthritic feeling in hands and feet    Codeine      \"Wide-awake\"    Morphine And Related     Zantac               Discharge Medications:     Discharge Medication List as of 10/8/2023 11:14 AM        START taking these medications    Details   amoxicillin-clavulanate (AUGMENTIN) 875-125 MG tablet Take 1 tablet by mouth 2 times daily for 2 days, Disp-4 tablet, R-0, E-Prescribe      azithromycin (ZITHROMAX) 250 MG tablet Take 1 tablet (250 mg) by mouth daily for 2 days, Disp-2 tablet, R-0, E-Prescribe           CONTINUE these medications which have NOT CHANGED    Details   amitriptyline (ELAVIL) 10 MG tablet Take 30 mg by mouth every morning, Historical      diltiazem ER (DILT-XR) 180 MG 24 hr capsule Take 180 mg by mouth daily, Historical      FLONASE 50 MCG/ACT NA SUSP Spray 1 spray into both nostrils 2 times daily, Historical      fluconazole (DIFLUCAN) 100 MG tablet Take 100 mg by mouth three times a week Sun, Wed, Fri, Historical      fluticasone-salmeterol (ADVAIR) 250-50 MCG/ACT inhaler Inhale 1 puff into the lungs every 12 hours, Historical      gabapentin (NEURONTIN) 600 MG tablet Take 1,200 mg by mouth 3 times daily, Historical      montelukast (SINGULAIR) 10 MG tablet Take 10 mg by mouth at bedtime, Historical      omeprazole (PRILOSEC OTC) 20 MG EC tablet Take 20 mg by mouth daily, Historical      simvastatin (ZOCOR) 10 MG tablet Take 10 mg by mouth at bedtime, Historical      warfarin ANTICOAGULANT (COUMADIN) 5 MG tablet Take 2.5-5 mg by mouth daily 5mg MWF, 2.5mg rest of the week, Historical      ZYRTEC ALLERGY PO Take 10 mg by mouth daily, Historical           STOP taking these medications       ADVAIR DISKUS 100-50 MCG/DOSE IN AEPB " "Comments:   Reason for Stopping:                      Condition on Discharge:     Discharge condition: Fair   Discharge vitals: Blood pressure 128/77, pulse 96, temperature 98.2  F (36.8  C), temperature source Oral, resp. rate 20, height 1.676 m (5' 6\"), weight 85.5 kg (188 lb 9.6 oz), SpO2 94 %.   Code status on discharge: Full Code      BASIC PHYSICAL EXAMINATION:  GENERAL: No apparent distress.  CARDIOVASCULAR: Regular rate and rhythm without murmurs.  PULMONARY: Clear to auscultation bilaterally.   GASTROINTESTINAL: Abdomen soft, non-tender.  EXTREMITIES: No edema, pulses intact.  NEUROLOGIC: No focal deficits.            History of Illness:   See detailed admission note for full details.               Procedures excluding imaging which is summarized below:     Please see details in the electronic medical record.           Consultations:     PHARMACY TO DOSE WARFARIN          Significant Results:     Results for orders placed or performed during the hospital encounter of 10/05/23   Chest XR,  PA & LAT    Narrative    EXAM: XR CHEST 2 VIEWS  LOCATION: [facility]  DATE: 10/6/2023    INDICATION: fever, cough  COMPARISON: None.      Impression    IMPRESSION: Cardiomediastinal silhouette within normal limits. Mild right perihilar and bibasilar atelectasis or infiltrate. Trace effusions not excluded.   CT Chest w Contrast    Narrative    EXAM: CT CHEST W CONTRAST  LOCATION: [facility]  DATE: 10/6/2023    INDICATION: fever, cough   eval for occult pneumonia  COMPARISON: None.  TECHNIQUE: CT chest with IV contrast. Multiplanar reformats were obtained. Dose reduction techniques were used.    CONTRAST: 91mL Isovue 370    FINDINGS:   LUNGS AND PLEURA: Patchy nodules or nodular infiltrates in the right upper lobe and right lower lobe. Atelectasis and scarring at the lung bases. Calcified granulomas at the lung bases bilaterally. There is no pneumothorax or pleural " effusion.    MEDIASTINUM/AXILLAE: There is an enlarged right infrahilar lymph node measuring approximately 3.2 x 1.7 cm. Few additional mildly enlarged mediastinal and right hilar nodes.    CORONARY ARTERY CALCIFICATION: Mild.    UPPER ABDOMEN: Faintly calcified stone in the gallbladder. Small hiatal hernia.    MUSCULOSKELETAL: Degenerative disease throughout spine.      Impression    IMPRESSION:   1.  Multiple nodules or nodular infiltrates in the right lung suggest pneumonia. Follow-up to resolution recommended.  2.  Few mildly enlarged right hilar and mediastinal lymph nodes.  3.  Cholelithiasis.       Transthoracic Echocardiogram Results:  No results found for this or any previous visit (from the past 4320 hour(s)).             Pending Results:     Unresulted Labs Ordered in the Past 30 Days of this Admission       Date and Time Order Name Status Description    10/5/2023 11:18 PM Blood Culture Peripheral Blood Preliminary     10/5/2023 11:18 PM Blood Culture Peripheral Blood Preliminary                         Discharge Instructions and Follow-Up:     Discharge instructions and follow-up:   Discharge Procedure Orders   Reason for your hospital stay   Order Comments: Community Acquired Pneumonia with Sepsis     Activity   Order Comments: Your activity upon discharge: activity as tolerated     Order Specific Question Answer Comments   Is discharge order? Yes      Follow-up and recommended labs and tests    Order Comments: Follow up with primary care provider, Natalya Matthews, within 7 days for hospital follow- up.  No follow up labs or test are needed.  Recommend you have a repeat chest x-ray in 4-6 weeks with your primary care doctor or pulmonologist to ensure complete resolution of your pneumonia.  Recommend you have a repeat INR early this week.     Diet   Order Comments: Follow this diet upon discharge: Orders Placed This Encounter      Combination Diet Regular Diet Adult     Order Specific Question Answer  Comments   Is discharge order? Yes           Total time spent in face to face contact with the patient and coordinating discharge was:  33 Minutes    Enmanuel Lopez DO  [facility] Hospitalist  [address] [place] [address]  10/08/2023

## 2023-10-08 NOTE — PROGRESS NOTES
[facility]    Hospitalist Progress Note  Name: Kaycee Fuchs    MRN: [id number]  Provider:  Enmanuel Lopez DO  Date of Service: 10/08/2023    Summary of Stay: Kaycee Fuchs is a 82 year old female with a history of asthma, atrial fibrillation on warfarin, GERD, neuropathy, hyperlipidemia, allergic rhinitis admitted on 10/5/2023 with cough and fever.  In the emergency department, the patient was found to have a troponin of 2.1 , blood pressure 108/95, heart rate 112, respiratory rate 20, SPO2 99% on room air.  Initial lab work showed sodium 134, BUN/creatinine 20/0.86, lactic acid 0.9, glucose 116, leukocytosis of 21.1, INR 2.75.  COVID, RSV, influenza were negative.  Chest x-ray showed mild right perihilar and bibasilar atelectasis or infiltrate.  CT chest showed multiple nodules or nodular infiltrates in the right lung suggestive of pneumonia, few mildly enlarged right hilar and mediastinal lymph nodes, cholelithiasis.  EKG showed atrial fibrillation with rapid ventricular rate.  The patient was started on ceftriaxone and azithromycin for the treatment of community-acquired pneumonia with sepsis.  The patient's oxygen was weaned.  On 10/8/2023, the patient was discharged home.    TODAY'S PLAN:  Doing well.  Discharge home today.  Reviewed discharge medications.  Daughter will pick pt up.  Fill meds here.    Problem List:   Acute Hypoxic Respiratory Failure  Community Acquired Pneumonia with Sepsis  - Temp 102.1F, , LA 0.9  - IV ceftriaxone and azithromycin  - Wean O2 as able     Atrial Fibrillation with RVR  Warfarin Coagulopathy  - Continue warfarin and diltiazem     Chronic Medical Problems:  Neuropathy  GERD  Hyperlipidemia  Allergic Rhinitis  Hx of Asthma  Obesity - BMI 30.44    I spent 44 minutes in reviewing this patient's labs, imaging, medications, medical history.  In addition time was spent interviewing the patient, communicating with family, and medical decision making.      DVT Prophylaxis: Enoxaparin (Lovenox) SQ  Code Status: Full Code  Diet: Combination Diet Regular Diet Adult    Shearer Catheter: Not present  Disposition: Expected discharge today to home. Goals prior to discharge include oxygen requirements improved.   Family updated today: No     Interval History   Pt seen and examined.  Pt denies any cp.  Still with nagging cough.    -Data reviewed today: I personally reviewed all new labs and imaging results over the last 24 hours.     Physical Exam   Temp: 98.2  F (36.8  C) Temp src: Oral BP: 128/77 Pulse: 96   Resp: 20 SpO2: 94 % O2 Device: None (Room air)    Vitals:    10/06/23 1524   Weight: 85.5 kg (188 lb 9.6 oz)     Vital Signs with Ranges  Temp:  [98  F (36.7  C)-99.8  F (37.7  C)] 98.2  F (36.8  C)  Pulse:  [77-96] 96  Resp:  [18-20] 20  BP: (112-128)/(43-77) 128/77  SpO2:  [92 %-96 %] 94 %  I/O last 3 completed shifts:  In: 3366 [P.O.:2320; I.V.:1046]  Out: 1700 [Urine:1700]    GENERAL: No apparent distress. Awake, alert, and fully oriented.  HEENT: Normocephalic, atraumatic. Extraocular movements intact.  CARDIOVASCULAR: Regular rate and rhythm without murmurs or rubs. No S3.  PULMONARY: Clear bilaterally.  GASTROINTESTINAL: Soft, non-tender, non-distended. Bowel sounds normoactive.   EXTREMITIES: No cyanosis or clubbing. No edema.  NEUROLOGICAL: CN 2-12 grossly intact, no focal neurological deficits.  DERMATOLOGICAL: No rash, ulcer, bruising, nor jaundice.    Medications      amitriptyline  30 mg Oral QAM    azithromycin  500 mg Intravenous Q24H    cefTRIAXone  1 g Intravenous Q24H    cetirizine (zyrTEC) tablet 10 mg  10 mg Oral Daily    diltiazem ER COATED BEADS  180 mg Oral Daily    docusate sodium  100 mg Oral BID    fluconazole  100 mg Oral Once per day on Sun Wed Fri    fluticasone  1 spray Both Nostrils Daily    fluticasone-vilanterol  1 puff Inhalation Daily    gabapentin  600 mg Oral TID    montelukast  10 mg Oral At Bedtime    pantoprazole  40 mg Oral QAM  AC    simvastatin  10 mg Oral QPM    Warfarin Therapy Reminder  1 each Oral See Admin Instructions     Data     Laboratory:  Recent Labs   Lab 10/06/23  0828 10/05/23  2329   WBC 15.8* 21.1*   HGB 13.3 14.6   HCT 40.6 42.9    96    283     Recent Labs   Lab 10/08/23  0639 10/07/23  0831 10/06/23  1231 10/06/23  0828 10/05/23  2329   NA  --   --   --  137 134*   POTASSIUM 4.0 4.1  --  4.1 4.5   CHLORIDE  --   --   --  102 98   CO2  --   --   --  28 28   ANIONGAP  --   --   --  7 8   GLC  --   --  115* 92 116*   BUN  --   --   --  13.0 12.8   CR  --   --   --  0.96* 0.96*   GFRESTIMATED  --   --   --  59* 59*   MELISSA  --   --   --  8.6* 9.4     No results for input(s): CULT in the last 168 hours.    Imaging:  No results found for this or any previous visit (from the past 24 hour(s)).      Enmanuel Lopez DO  [facility] Hospitalist  [address] [place] [address]  Securely message with [employer] (more info)  Text page via [email or web address] Paging/Directory   10/08/2023

## 2023-10-08 NOTE — PLAN OF CARE
"Goal Outcome Evaluation:      Plan of Care Reviewed With: patient    Overall Patient Progress: improvingOverall Patient Progress: improving    [id number]: A&Ox4. VSS. RA. Tele Afib CVR. Reported right shoulder discomfort; pt states \"it is not new\" and use to see a physical therapist out patient. RN offered interventions, yet pt declined and slept remainder of the night. Scheduled iv rocephin and zithro given. Voiding. K+ & Mg protocol. Safety checks completed and call light within reach. Potential discharge today.   "

## 2023-10-09 ENCOUNTER — PATIENT OUTREACH (OUTPATIENT)
Dept: CARE COORDINATION | Facility: CLINIC | Age: 83
End: 2023-10-09
Payer: MEDICARE

## 2023-10-09 NOTE — PROGRESS NOTES
Clinic Care Coordination Contact  [facility]: Post-Discharge Note  SITUATION                                                      Admission:    Admission Date: 10/05/23   Reason for Admission: cough and fever  Discharge:   Discharge Date: 10/08/23  Discharge Diagnosis: Acute Hypoxic Respiratory Failure  Community Acquired Pneumonia with Sepsis    BACKGROUND                                                      Per hospital discharge summary and inpatient provider notes:    lauro Fuchs is a 82 year old female with a history of asthma, atrial fibrillation on warfarin, GERD, neuropathy, hyperlipidemia, allergic rhinitis admitted on 10/5/2023 with cough and fever.     ASSESSMENT           Discharge Assessment  How are you doing now that you are home?: Better but weak  How are your symptoms? (Red Flag symptoms escalate to triage hotline per guidelines): Improved  Do you feel your condition is stable enough to be safe at home until your provider visit?: Yes  Does the patient have their discharge instructions? : Yes  Does the patient have questions regarding their discharge instructions? : No  Were you started on any new medications or were there changes to any of your previous medications? : Yes  Does the patient have all of their medications?: Yes  Do you have questions regarding any of your medications? : No  Discharge follow-up appointment scheduled within 14 calendar days? : No  Is patient agreeable to assistance with scheduling? : No (Patient will call PCP office to schedule INR and hospital follow up appointment)         Post-op (Clinicians Only)  Did the patient have surgery or a procedure: No    Patient improved but still feeling weak. Reports no shortness of breath but needs to rest with activity. Cough manageable with Ricola cough drops and doesn't feel need for cough medication. Taking antibiotics as prescribed. No further questions or concerns from patient. 24/7 [facility] nurse triage phone number  provided to patient.       PLAN                                                      Outpatient Plan:  Follow up with primary care provider, Natalya Matthews, within 7 days for hospital follow- up. No follow up labs or test are needed. Recommend you have a repeat chest x-ray in 4-6 weeks with your primary care doctor or pulmonologist to  ensure complete resolution of your pneumonia. Recommend you have a repeat INR early this week.    No future appointments.      For any urgent concerns, please contact our 24 hour nurse triage line: [phone])         Mignon Ng RN

## 2023-10-11 LAB
BACTERIA BLD CULT: NO GROWTH
BACTERIA BLD CULT: NO GROWTH

## 2023-10-12 ENCOUNTER — LAB (OUTPATIENT)
Dept: LAB | Facility: CLINIC | Age: 83
End: 2023-10-12
Payer: MEDICARE

## 2023-10-12 DIAGNOSIS — I48.91 ATRIAL FIBRILLATION (H): ICD-10-CM

## 2023-10-12 LAB — INR BLD: 2.3 (ref 0.9–1.1)

## 2023-10-12 PROCEDURE — 36416 COLLJ CAPILLARY BLOOD SPEC: CPT

## 2023-10-12 PROCEDURE — 85610 PROTHROMBIN TIME: CPT

## 2024-02-25 ENCOUNTER — HOSPITAL ENCOUNTER (INPATIENT)
Facility: CLINIC | Age: 84
LOS: 4 days | Discharge: HOME | DRG: 871 | End: 2024-02-29
Attending: EMERGENCY MEDICINE | Admitting: INTERNAL MEDICINE
Payer: MEDICARE

## 2024-02-25 ENCOUNTER — APPOINTMENT (OUTPATIENT)
Dept: GENERAL RADIOLOGY | Facility: CLINIC | Age: 84
DRG: 871 | End: 2024-02-25
Attending: EMERGENCY MEDICINE
Payer: MEDICARE

## 2024-02-25 DIAGNOSIS — A41.9 SEPSIS WITH ACUTE HYPOXIC RESPIRATORY FAILURE WITHOUT SEPTIC SHOCK, DUE TO UNSPECIFIED ORGANISM (H): ICD-10-CM

## 2024-02-25 DIAGNOSIS — J96.01 SEPSIS WITH ACUTE HYPOXIC RESPIRATORY FAILURE WITHOUT SEPTIC SHOCK, DUE TO UNSPECIFIED ORGANISM (H): ICD-10-CM

## 2024-02-25 DIAGNOSIS — I48.91 ATRIAL FIBRILLATION WITH RVR (H): ICD-10-CM

## 2024-02-25 DIAGNOSIS — J45.901 ASTHMA WITH ACUTE EXACERBATION, UNSPECIFIED ASTHMA SEVERITY, UNSPECIFIED WHETHER PERSISTENT: ICD-10-CM

## 2024-02-25 DIAGNOSIS — I48.91 ATRIAL FIBRILLATION WITH RAPID VENTRICULAR RESPONSE (H): Primary | ICD-10-CM

## 2024-02-25 DIAGNOSIS — R65.20 SEPSIS WITH ACUTE HYPOXIC RESPIRATORY FAILURE WITHOUT SEPTIC SHOCK, DUE TO UNSPECIFIED ORGANISM (H): ICD-10-CM

## 2024-02-25 DIAGNOSIS — J18.9 PNEUMONIA OF RIGHT LUNG DUE TO INFECTIOUS ORGANISM, UNSPECIFIED PART OF LUNG: ICD-10-CM

## 2024-02-25 LAB
ALBUMIN SERPL BCG-MCNC: 4 G/DL (ref 3.5–5.2)
ALP SERPL-CCNC: 60 U/L (ref 40–150)
ALT SERPL W P-5'-P-CCNC: 13 U/L (ref 0–50)
ANION GAP SERPL CALCULATED.3IONS-SCNC: 10 MMOL/L (ref 7–15)
AST SERPL W P-5'-P-CCNC: 37 U/L (ref 0–45)
BASE EXCESS BLDV CALC-SCNC: 2.3 MMOL/L (ref -3–3)
BASOPHILS # BLD AUTO: 0 10E3/UL (ref 0–0.2)
BASOPHILS NFR BLD AUTO: 0 %
BILIRUB SERPL-MCNC: 0.5 MG/DL
BUN SERPL-MCNC: 12.5 MG/DL (ref 8–23)
CALCIUM SERPL-MCNC: 8.9 MG/DL (ref 8.8–10.2)
CHLORIDE SERPL-SCNC: 104 MMOL/L (ref 98–107)
CREAT SERPL-MCNC: 0.91 MG/DL (ref 0.51–0.95)
DEPRECATED HCO3 PLAS-SCNC: 25 MMOL/L (ref 22–29)
EGFRCR SERPLBLD CKD-EPI 2021: 62 ML/MIN/1.73M2
EOSINOPHIL # BLD AUTO: 0.1 10E3/UL (ref 0–0.7)
EOSINOPHIL NFR BLD AUTO: 0 %
ERYTHROCYTE [DISTWIDTH] IN BLOOD BY AUTOMATED COUNT: 13.3 % (ref 10–15)
FLUAV RNA SPEC QL NAA+PROBE: NEGATIVE
FLUBV RNA RESP QL NAA+PROBE: NEGATIVE
GLUCOSE SERPL-MCNC: 142 MG/DL (ref 70–99)
HCO3 BLDV-SCNC: 27 MMOL/L (ref 21–28)
HCT VFR BLD AUTO: 40.2 % (ref 35–47)
HGB BLD-MCNC: 13.4 G/DL (ref 11.7–15.7)
HOLD SPECIMEN: NORMAL
HOLD SPECIMEN: NORMAL
IMM GRANULOCYTES # BLD: 0 10E3/UL
IMM GRANULOCYTES NFR BLD: 0 %
INR PPP: 2.71 (ref 0.85–1.15)
INR PPP: 2.73 (ref 0.85–1.15)
LACTATE SERPL-SCNC: 1 MMOL/L (ref 0.7–2)
LACTATE SERPL-SCNC: 1.3 MMOL/L (ref 0.7–2)
LYMPHOCYTES # BLD AUTO: 0.6 10E3/UL (ref 0.8–5.3)
LYMPHOCYTES NFR BLD AUTO: 6 %
MCH RBC QN AUTO: 32.4 PG (ref 26.5–33)
MCHC RBC AUTO-ENTMCNC: 33.3 G/DL (ref 31.5–36.5)
MCV RBC AUTO: 97 FL (ref 78–100)
MONOCYTES # BLD AUTO: 0.3 10E3/UL (ref 0–1.3)
MONOCYTES NFR BLD AUTO: 2 %
NEUTROPHILS # BLD AUTO: 10.3 10E3/UL (ref 1.6–8.3)
NEUTROPHILS NFR BLD AUTO: 92 %
NRBC # BLD AUTO: 0 10E3/UL
NRBC BLD AUTO-RTO: 0 /100
O2/TOTAL GAS SETTING VFR VENT: 40 %
OXYHGB MFR BLDV: 80 % (ref 70–75)
PCO2 BLDV: 40 MM HG (ref 40–50)
PH BLDV: 7.43 [PH] (ref 7.32–7.43)
PLATELET # BLD AUTO: 239 10E3/UL (ref 150–450)
PO2 BLDV: 44 MM HG (ref 25–47)
POTASSIUM SERPL-SCNC: 4.4 MMOL/L (ref 3.4–5.3)
PROT SERPL-MCNC: 6.9 G/DL (ref 6.4–8.3)
RBC # BLD AUTO: 4.14 10E6/UL (ref 3.8–5.2)
RSV RNA SPEC NAA+PROBE: NEGATIVE
SAO2 % BLDV: 82.1 % (ref 70–75)
SARS-COV-2 RNA RESP QL NAA+PROBE: NEGATIVE
SODIUM SERPL-SCNC: 139 MMOL/L (ref 135–145)
WBC # BLD AUTO: 11.3 10E3/UL (ref 4–11)

## 2024-02-25 PROCEDURE — 250N000011 HC RX IP 250 OP 636: Mod: JZ | Performed by: EMERGENCY MEDICINE

## 2024-02-25 PROCEDURE — 250N000012 HC RX MED GY IP 250 OP 636 PS 637: Performed by: EMERGENCY MEDICINE

## 2024-02-25 PROCEDURE — 99223 1ST HOSP IP/OBS HIGH 75: CPT | Mod: AI | Performed by: INTERNAL MEDICINE

## 2024-02-25 PROCEDURE — 258N000003 HC RX IP 258 OP 636: Performed by: EMERGENCY MEDICINE

## 2024-02-25 PROCEDURE — 250N000013 HC RX MED GY IP 250 OP 250 PS 637: Performed by: HOSPITALIST

## 2024-02-25 PROCEDURE — 96367 TX/PROPH/DG ADDL SEQ IV INF: CPT

## 2024-02-25 PROCEDURE — 71045 X-RAY EXAM CHEST 1 VIEW: CPT

## 2024-02-25 PROCEDURE — 250N000013 HC RX MED GY IP 250 OP 250 PS 637: Performed by: EMERGENCY MEDICINE

## 2024-02-25 PROCEDURE — 250N000009 HC RX 250: Performed by: EMERGENCY MEDICINE

## 2024-02-25 PROCEDURE — 99292 CRITICAL CARE ADDL 30 MIN: CPT

## 2024-02-25 PROCEDURE — 82040 ASSAY OF SERUM ALBUMIN: CPT | Performed by: EMERGENCY MEDICINE

## 2024-02-25 PROCEDURE — 85025 COMPLETE CBC W/AUTO DIFF WBC: CPT | Performed by: EMERGENCY MEDICINE

## 2024-02-25 PROCEDURE — 87040 BLOOD CULTURE FOR BACTERIA: CPT | Performed by: EMERGENCY MEDICINE

## 2024-02-25 PROCEDURE — 250N000009 HC RX 250: Performed by: INTERNAL MEDICINE

## 2024-02-25 PROCEDURE — 99291 CRITICAL CARE FIRST HOUR: CPT | Mod: 25

## 2024-02-25 PROCEDURE — 94640 AIRWAY INHALATION TREATMENT: CPT

## 2024-02-25 PROCEDURE — 36415 COLL VENOUS BLD VENIPUNCTURE: CPT | Performed by: INTERNAL MEDICINE

## 2024-02-25 PROCEDURE — 120N000013 HC R&B IMCU

## 2024-02-25 PROCEDURE — 83605 ASSAY OF LACTIC ACID: CPT | Performed by: EMERGENCY MEDICINE

## 2024-02-25 PROCEDURE — 96365 THER/PROPH/DIAG IV INF INIT: CPT

## 2024-02-25 PROCEDURE — 93005 ELECTROCARDIOGRAM TRACING: CPT

## 2024-02-25 PROCEDURE — 82805 BLOOD GASES W/O2 SATURATION: CPT | Performed by: EMERGENCY MEDICINE

## 2024-02-25 PROCEDURE — 96376 TX/PRO/DX INJ SAME DRUG ADON: CPT

## 2024-02-25 PROCEDURE — 250N000013 HC RX MED GY IP 250 OP 250 PS 637: Performed by: INTERNAL MEDICINE

## 2024-02-25 PROCEDURE — 36415 COLL VENOUS BLD VENIPUNCTURE: CPT | Performed by: EMERGENCY MEDICINE

## 2024-02-25 PROCEDURE — 85610 PROTHROMBIN TIME: CPT | Performed by: INTERNAL MEDICINE

## 2024-02-25 PROCEDURE — 85610 PROTHROMBIN TIME: CPT | Performed by: EMERGENCY MEDICINE

## 2024-02-25 PROCEDURE — 87637 SARSCOV2&INF A&B&RSV AMP PRB: CPT | Performed by: EMERGENCY MEDICINE

## 2024-02-25 PROCEDURE — 999N000157 HC STATISTIC RCP TIME EA 10 MIN

## 2024-02-25 PROCEDURE — 96375 TX/PRO/DX INJ NEW DRUG ADDON: CPT

## 2024-02-25 RX ORDER — FLUTICASONE FUROATE AND VILANTEROL 100; 25 UG/1; UG/1
1 POWDER RESPIRATORY (INHALATION) DAILY
Status: DISCONTINUED | OUTPATIENT
Start: 2024-02-25 | End: 2024-02-29 | Stop reason: HOSPADM

## 2024-02-25 RX ORDER — MONTELUKAST SODIUM 10 MG/1
10 TABLET ORAL AT BEDTIME
Status: DISCONTINUED | OUTPATIENT
Start: 2024-02-25 | End: 2024-02-29 | Stop reason: HOSPADM

## 2024-02-25 RX ORDER — AMOXICILLIN 250 MG
1 CAPSULE ORAL 2 TIMES DAILY PRN
Status: DISCONTINUED | OUTPATIENT
Start: 2024-02-25 | End: 2024-02-29 | Stop reason: HOSPADM

## 2024-02-25 RX ORDER — AZITHROMYCIN 500 MG/5ML
500 INJECTION, POWDER, LYOPHILIZED, FOR SOLUTION INTRAVENOUS EVERY 24 HOURS
Status: DISCONTINUED | OUTPATIENT
Start: 2024-02-26 | End: 2024-02-27

## 2024-02-25 RX ORDER — BENZONATATE 100 MG/1
100 CAPSULE ORAL 3 TIMES DAILY PRN
Status: DISCONTINUED | OUTPATIENT
Start: 2024-02-25 | End: 2024-02-29 | Stop reason: HOSPADM

## 2024-02-25 RX ORDER — LEVALBUTEROL INHALATION SOLUTION 1.25 MG/3ML
1.25 SOLUTION RESPIRATORY (INHALATION) EVERY 4 HOURS PRN
Status: DISCONTINUED | OUTPATIENT
Start: 2024-02-25 | End: 2024-02-28

## 2024-02-25 RX ORDER — SIMVASTATIN 10 MG
10 TABLET ORAL AT BEDTIME
Status: DISCONTINUED | OUTPATIENT
Start: 2024-02-25 | End: 2024-02-29 | Stop reason: HOSPADM

## 2024-02-25 RX ORDER — DIGOXIN 0.25 MG/ML
250 INJECTION INTRAMUSCULAR; INTRAVENOUS ONCE
Status: COMPLETED | OUTPATIENT
Start: 2024-02-25 | End: 2024-02-25

## 2024-02-25 RX ORDER — CEFTRIAXONE 2 G/1
2 INJECTION, POWDER, FOR SOLUTION INTRAMUSCULAR; INTRAVENOUS EVERY 24 HOURS
Status: DISCONTINUED | OUTPATIENT
Start: 2024-02-26 | End: 2024-02-29 | Stop reason: HOSPADM

## 2024-02-25 RX ORDER — CEFTRIAXONE 2 G/1
2 INJECTION, POWDER, FOR SOLUTION INTRAMUSCULAR; INTRAVENOUS ONCE
Status: COMPLETED | OUTPATIENT
Start: 2024-02-25 | End: 2024-02-25

## 2024-02-25 RX ORDER — IPRATROPIUM BROMIDE AND ALBUTEROL SULFATE 2.5; .5 MG/3ML; MG/3ML
SOLUTION RESPIRATORY (INHALATION)
Status: COMPLETED
Start: 2024-02-25 | End: 2024-02-25

## 2024-02-25 RX ORDER — ACETAMINOPHEN 500 MG
1000 TABLET ORAL ONCE
Status: COMPLETED | OUTPATIENT
Start: 2024-02-25 | End: 2024-02-25

## 2024-02-25 RX ORDER — PREDNISONE 20 MG/1
40 TABLET ORAL DAILY
Status: DISCONTINUED | OUTPATIENT
Start: 2024-02-26 | End: 2024-02-29 | Stop reason: HOSPADM

## 2024-02-25 RX ORDER — CETIRIZINE HYDROCHLORIDE 10 MG/1
10 TABLET ORAL DAILY
Status: DISCONTINUED | OUTPATIENT
Start: 2024-02-25 | End: 2024-02-29 | Stop reason: HOSPADM

## 2024-02-25 RX ORDER — IPRATROPIUM BROMIDE AND ALBUTEROL SULFATE 2.5; .5 MG/3ML; MG/3ML
3 SOLUTION RESPIRATORY (INHALATION) ONCE
Status: COMPLETED | OUTPATIENT
Start: 2024-02-25 | End: 2024-02-25

## 2024-02-25 RX ORDER — CALCIUM CARBONATE 500 MG/1
1000 TABLET, CHEWABLE ORAL 4 TIMES DAILY PRN
Status: DISCONTINUED | OUTPATIENT
Start: 2024-02-25 | End: 2024-02-29 | Stop reason: HOSPADM

## 2024-02-25 RX ORDER — LIDOCAINE 40 MG/G
CREAM TOPICAL
Status: DISCONTINUED | OUTPATIENT
Start: 2024-02-25 | End: 2024-02-29 | Stop reason: HOSPADM

## 2024-02-25 RX ORDER — DILTIAZEM HYDROCHLORIDE 180 MG/1
180 CAPSULE, COATED, EXTENDED RELEASE ORAL DAILY
Status: DISCONTINUED | OUTPATIENT
Start: 2024-02-25 | End: 2024-02-25

## 2024-02-25 RX ORDER — DILTIAZEM HYDROCHLORIDE 180 MG/1
180 CAPSULE, COATED, EXTENDED RELEASE ORAL DAILY
Status: DISCONTINUED | OUTPATIENT
Start: 2024-02-25 | End: 2024-02-29 | Stop reason: HOSPADM

## 2024-02-25 RX ORDER — PANTOPRAZOLE SODIUM 20 MG/1
20 TABLET, DELAYED RELEASE ORAL DAILY
Status: DISCONTINUED | OUTPATIENT
Start: 2024-02-25 | End: 2024-02-29 | Stop reason: HOSPADM

## 2024-02-25 RX ORDER — AZITHROMYCIN 500 MG/5ML
500 INJECTION, POWDER, LYOPHILIZED, FOR SOLUTION INTRAVENOUS ONCE
Status: COMPLETED | OUTPATIENT
Start: 2024-02-25 | End: 2024-02-25

## 2024-02-25 RX ORDER — GABAPENTIN 600 MG/1
1200 TABLET ORAL 3 TIMES DAILY
Status: DISCONTINUED | OUTPATIENT
Start: 2024-02-25 | End: 2024-02-29 | Stop reason: HOSPADM

## 2024-02-25 RX ORDER — PANTOPRAZOLE SODIUM 40 MG/1
40 TABLET, DELAYED RELEASE ORAL DAILY
Status: DISCONTINUED | OUTPATIENT
Start: 2024-02-25 | End: 2024-02-25

## 2024-02-25 RX ORDER — WARFARIN SODIUM 2.5 MG/1
2.5 TABLET ORAL
Status: COMPLETED | OUTPATIENT
Start: 2024-02-25 | End: 2024-02-25

## 2024-02-25 RX ORDER — AMOXICILLIN 250 MG
2 CAPSULE ORAL 2 TIMES DAILY PRN
Status: DISCONTINUED | OUTPATIENT
Start: 2024-02-25 | End: 2024-02-29 | Stop reason: HOSPADM

## 2024-02-25 RX ORDER — PREDNISONE 20 MG/1
40 TABLET ORAL ONCE
Status: COMPLETED | OUTPATIENT
Start: 2024-02-25 | End: 2024-02-25

## 2024-02-25 RX ADMIN — IPRATROPIUM BROMIDE AND ALBUTEROL SULFATE 3 ML: .5; 3 SOLUTION RESPIRATORY (INHALATION) at 09:45

## 2024-02-25 RX ADMIN — DIGOXIN 250 MCG: 0.25 INJECTION INTRAMUSCULAR; INTRAVENOUS at 12:47

## 2024-02-25 RX ADMIN — LEVALBUTEROL HYDROCHLORIDE 1.25 MG: 1.25 SOLUTION RESPIRATORY (INHALATION) at 22:59

## 2024-02-25 RX ADMIN — WARFARIN SODIUM 2.5 MG: 2.5 TABLET ORAL at 18:17

## 2024-02-25 RX ADMIN — ACETAMINOPHEN 1000 MG: 500 TABLET, FILM COATED ORAL at 09:34

## 2024-02-25 RX ADMIN — GABAPENTIN 1200 MG: 600 TABLET, FILM COATED ORAL at 17:08

## 2024-02-25 RX ADMIN — PANTOPRAZOLE SODIUM 20 MG: 20 TABLET, DELAYED RELEASE ORAL at 18:17

## 2024-02-25 RX ADMIN — Medication 1 LOZENGE: at 22:56

## 2024-02-25 RX ADMIN — MONTELUKAST 10 MG: 10 TABLET, FILM COATED ORAL at 21:55

## 2024-02-25 RX ADMIN — CEFTRIAXONE 2 G: 2 INJECTION, POWDER, FOR SOLUTION INTRAMUSCULAR; INTRAVENOUS at 10:19

## 2024-02-25 RX ADMIN — SIMVASTATIN 10 MG: 10 TABLET, FILM COATED ORAL at 21:55

## 2024-02-25 RX ADMIN — CETIRIZINE HYDROCHLORIDE 10 MG: 10 TABLET, FILM COATED ORAL at 17:08

## 2024-02-25 RX ADMIN — AZITHROMYCIN MONOHYDRATE 500 MG: 500 INJECTION, POWDER, LYOPHILIZED, FOR SOLUTION INTRAVENOUS at 10:56

## 2024-02-25 RX ADMIN — DIGOXIN 250 MCG: 0.25 INJECTION INTRAMUSCULAR; INTRAVENOUS at 14:52

## 2024-02-25 RX ADMIN — SODIUM CHLORIDE 500 ML: 9 INJECTION, SOLUTION INTRAVENOUS at 10:08

## 2024-02-25 RX ADMIN — DILTIAZEM HYDROCHLORIDE 180 MG: 180 CAPSULE, COATED, EXTENDED RELEASE ORAL at 17:08

## 2024-02-25 RX ADMIN — GABAPENTIN 1200 MG: 600 TABLET, FILM COATED ORAL at 21:55

## 2024-02-25 RX ADMIN — BENZONATATE 100 MG: 100 CAPSULE ORAL at 22:56

## 2024-02-25 RX ADMIN — PREDNISONE 40 MG: 20 TABLET ORAL at 10:17

## 2024-02-25 ASSESSMENT — ACTIVITIES OF DAILY LIVING (ADL)
ADLS_ACUITY_SCORE: 25
ADLS_ACUITY_SCORE: 38
ADLS_ACUITY_SCORE: 38
ADLS_ACUITY_SCORE: 34
ADLS_ACUITY_SCORE: 38
ADLS_ACUITY_SCORE: 38
ADLS_ACUITY_SCORE: 25
ADLS_ACUITY_SCORE: 25
ADLS_ACUITY_SCORE: 38
ADLS_ACUITY_SCORE: 25

## 2024-02-25 NOTE — ED PROVIDER NOTES
History     Chief Complaint:  Shortness of Breath and Asthma Exacerbation    The history is provided by the patient.      Kaycee Fuchs is a 83 year old female with a history of atrial fibrillation and asthma presenting with shortness of breath and asthma exacerbation. Patient has been experiencing sinus symptoms the past few days. She woke up this morning with severe cough at 0400. She reports vomiting up mucus and blood. Kaycee was experiencing chest tightness earlier, but notes this has resolved since receiving 1 duoneb and 2 albuterol nebs en route. Patient endorses chills and fever. Denies increased swelling or abdominal pain. No history of smoking.     Independent Historian:   None - Patient Only    Review of External Notes:   Outside clinic notes reviewed.  I do not see a recent cardiology note nor recent echocardiogram.  No recent outpatient clinic notes available for review.    Medications:    Albuterol   Amitriptyline   Diltiazem   Furosemide   Gabapentin  Montelukast  Omeprazole  Simvastatin   Warfarin     Past Medical History:    Asthma   Afib   Colon polyp   Dyslipidemia   Episcleritis   GERD  Hypercholesteremia   Osteopenia   Onychomycosis   Pneumonia   Pleural effusion   Recurrent Respiratory tract infection   SVT    Past Surgical History:    Foot surgery   Hip joint replacement   Breast biopsy   GI colon      Physical Exam   Patient Vitals for the past 24 hrs:   BP Temp Temp src Pulse Resp SpO2   02/25/24 1400 -- -- -- 112 13 98 %   02/25/24 1350 115/67 -- -- (!) 137 16 98 %   02/25/24 1345 108/67 -- -- 113 17 98 %   02/25/24 1330 95/70 -- -- (!) 152 18 97 %   02/25/24 1320 118/81 -- -- (!) 121 17 96 %   02/25/24 1310 91/71 -- -- (!) 133 (!) 38 97 %   02/25/24 1300 114/81 -- -- (!) 131 20 97 %   02/25/24 1250 101/69 -- -- (!) 137 11 98 %   02/25/24 1245 99/72 -- -- (!) 130 14 93 %   02/25/24 1231 91/71 -- -- (!) 135 16 96 %   02/25/24 1230 91/71 -- -- 114 19 97 %   02/25/24 1220 91/66 -- -- (!)  140 22 97 %   02/25/24 1210 95/67 -- -- (!) 146 14 99 %   02/25/24 1205 -- -- -- (!) 144 14 98 %   02/25/24 1201 95/71 -- -- (!) 137 18 97 %   02/25/24 1200 (!) 88/65 -- -- (!) 131 18 96 %   02/25/24 1145 (!) 86/66 -- -- (!) 134 21 94 %   02/25/24 1140 (!) 103/90 -- -- (!) 160 12 97 %   02/25/24 1139 (!) 103/90 -- -- (!) 142 18 97 %   02/25/24 1130 (!) 82/69 -- -- (!) 142 15 97 %   02/25/24 1124 99/64 98.6  F (37  C) Oral (!) 149 25 94 %   02/25/24 1123 99/64 -- -- (!) 149 19 96 %   02/25/24 1122 -- -- -- (!) 151 18 97 %   02/25/24 1115 (!) 88/64 -- -- (!) 168 20 97 %   02/25/24 1102 94/56 -- -- (!) 146 22 97 %   02/25/24 1054 94/64 -- -- (!) 140 21 98 %   02/25/24 0950 104/67 -- -- (!) 158 28 97 %   02/25/24 0940 -- -- -- (!) 160 23 97 %   02/25/24 0930 -- -- -- (!) 163 23 95 %   02/25/24 0920 -- -- -- (!) 172 25 95 %   02/25/24 0915 99/66 -- -- (!) 181 17 94 %   02/25/24 0910 -- -- -- (!) 163 20 97 %   02/25/24 0908 -- -- -- (!) 163 (!) 42 (!) 88 %   02/25/24 0900 126/82 -- -- (!) 170 (!) 33 90 %   02/25/24 0856 -- (!) 102.2  F (39  C) Oral (!) 161 19 93 %   02/25/24 0845 131/79 -- -- (!) 163 18 96 %      Physical Exam  General: Large elderly female sitting upright  Eyes: PERRL, Conjunctive within normal limits.  No scleral icterus.  ENT: Moist mucous membranes, oropharynx clear.   CV: Normal S1S2, no murmur, rub or gallop.  Irregularly irregular.  Tachycardic.  Resp: Mildly diminished throughout.  I do not appreciate any rales or rhonchi.  Scattered wheeze on expiratory phase.  Tachypneic.  Mildly increased work of breathing.  GI: Abdomen is soft, nontender and nondistended. No palpable masses. No rebound or guarding.  MSK: No pitting edema. Nontender. Normal active range of motion.  Skin: Warm and dry. No rashes or lesions or ecchymoses on visible skin.  Neuro: Alert and oriented. Responds appropriately to all questions and commands. No focal findings appreciated. Normal muscle tone.  Psych: Normal mood and  affect.     Emergency Department Course   ECG  ECG taken at 0844, ECG read at 0900  Atrial fibrillation with rapid ventricular response  Low voltage QRS  ST & T wave abnormality, consider inferolateral ischemia   Abnormal ECG   Rate 168 bpm. AL interval * ms. QRS duration 72 ms. QT/QTc 222/371 ms. P-R-T axes * 78 240.     Imaging:  XR Chest Port 1 View   Preliminary Result   IMPRESSION: Increased opacity in the perihilar right upper lobe. Small right pleural effusion. Stable atelectasis or scarring at the lung bases. Cardiomegaly with mild pulmonary vascular congestion.            Laboratory:  Labs Ordered and Resulted from Time of ED Arrival to Time of ED Departure   COMPREHENSIVE METABOLIC PANEL - Abnormal       Result Value    Sodium 139      Potassium 4.4      Carbon Dioxide (CO2) 25      Anion Gap 10      Urea Nitrogen 12.5      Creatinine 0.91      GFR Estimate 62      Calcium 8.9      Chloride 104      Glucose 142 (*)     Alkaline Phosphatase 60      AST 37      ALT 13      Protein Total 6.9      Albumin 4.0      Bilirubin Total 0.5     BLOOD GAS VENOUS - Abnormal    pH Venous 7.43      pCO2 Venous 40      pO2 Venous 44      Bicarbonate Venous 27      Base Excess/Deficit Venous 2.3      FIO2 40      Oxyhemoglobin Venous 80 (*)     O2 Sat, Venous 82.1 (*)    CBC WITH PLATELETS AND DIFFERENTIAL - Abnormal    WBC Count 11.3 (*)     RBC Count 4.14      Hemoglobin 13.4      Hematocrit 40.2      MCV 97      MCH 32.4      MCHC 33.3      RDW 13.3      Platelet Count 239      % Neutrophils 92      % Lymphocytes 6      % Monocytes 2      % Eosinophils 0      % Basophils 0      % Immature Granulocytes 0      NRBCs per 100 WBC 0      Absolute Neutrophils 10.3 (*)     Absolute Lymphocytes 0.6 (*)     Absolute Monocytes 0.3      Absolute Eosinophils 0.1      Absolute Basophils 0.0      Absolute Immature Granulocytes 0.0      Absolute NRBCs 0.0     INR - Abnormal    INR 2.71 (*)    LACTIC ACID WHOLE BLOOD - Normal    Lactic  "Acid 1.3     INFLUENZA A/B, RSV, & SARS-COV2 PCR - Normal    Influenza A PCR Negative      Influenza B PCR Negative      RSV PCR Negative      SARS CoV2 PCR Negative     LACTIC ACID WHOLE BLOOD - Normal    Lactic Acid 1.0     BLOOD CULTURE   BLOOD CULTURE        Emergency Department Course & Assessments:    Interventions:  Medications   acetaminophen (TYLENOL) tablet 1,000 mg (has no administration in time range)   ipratropium - albuterol 0.5 mg/2.5 mg/3 mL (DUONEB) 0.5-2.5 (3) MG/3ML neb solution (has no administration in time range)   ipratropium - albuterol 0.5 mg/2.5 mg/3 mL (DUONEB) neb solution 3 mL (has no administration in time range)   predniSONE (DELTASONE) tablet 40 mg (has no administration in time range)   sodium chloride 0.9% BOLUS 500 mL (has no administration in time range)      Independent Interpretation (X-rays, CTs, rhythm strip):  I independently interpreted the chest XR, there is evidence of an opacity in the right upper lung.    Assessments/Consultations/Discussion of Management or Tests:  ED Course as of 02/25/24 1404   Sun Feb 25, 2024   0903 I obtained the history and examined the patient as noted above.      1024 I rechecked and updated the patient.      1058 I rechecked and updated the patient.      1201 I spoke with Dr. Crabtree from the hospitalist service regarding the patient's presentation, findings here in the ED, and plan of care.     1226 I spoke with Dr. De Anda from Cardiology regarding the patient's presentation and plan of care.  She recommends digoxin.  250 mcg followed by a second dose of 250 mcg in 1 hour if she is still persistently tachycardic.     1236 I spoke with Dr. Crabtree from the hospitalist service regarding the patient's presentation, findings here in the ED, and plan of care.     1340 I rechecked and updated the patient.  She is sleeping but easily awakened.  She notes she continues to feel well and her shortness of breath is \"not an issue now\".       Social " Determinants of Health affecting care:   None    Disposition:  The patient was admitted to the hospital under the care of Dr. Crabtree.     Impression & Plan      Medical Decision Making:  Kaycee Fuchs is an 83-year-old female with history of asthma and atrial fibrillation on Coumadin who presents emergency department with concerns for fever and shortness of breath.  I suspect the etiology of her symptoms is multifactorial including pneumonia and acute asthma exacerbation as well as some element perhaps of the atrial fibrillation with RVR.  She does not appear to be in overt heart failure.  She had no chest pain.  I do not think ACS is a component of her symptoms.  ECG demonstrates atrial fibrillation with RVR.  She meets criteria for sepsis and has acute hypoxic respiratory failure but does not have septic shock otherwise.  She is given broad-spectrum antibiotic with ceftriaxone and azithromycin administered.  Blood cultures are pending.  Prednisone was administered given concerns for asthma exacerbation.  Cautious fluid hydration was administered given signs of increased pulmonary vasculature on chest x-ray in order to avoid volume overload.  Despite this she remained tachycardic and her blood pressures were in the low 80s to high 90s.  The patient has persistent A-fib at baseline and I suspect the RVR is secondary to the infectious stimulus.  I suspect her blood pressure is low because of the rapid A-fib rather than septic shock.  I discussed her care with Dr. Alba of cardiology who felt digoxin would be a reasonable option.  She was given a first dose and did have some response with improved heart rate and stabilized blood pressures.  A second dose was ordered for her given the ongoing heart rate although it is significantly improved.  There is no indication for vasopressors.  She will be admitted to intermediate level of care given her complex medical condition but at this time is stable and overall improved.   She is alert and appropriate reassessment and notes she feels tired but otherwise much improved.  All questions were answered prior to admission and she is agreeable with this plan.    Diagnosis:    ICD-10-CM    1. Sepsis with acute hypoxic respiratory failure without septic shock, due to unspecified organism (H)  A41.9     R65.20     J96.01       2. Pneumonia of right lung due to infectious organism, unspecified part of lung  J18.9       3. Asthma with acute exacerbation, unspecified asthma severity, unspecified whether persistent  J45.901       4. Atrial fibrillation with RVR (H)  I48.91         Scribe Disclosure:  Dahiana PELLETIER, am serving as a scribe at 8:58 AM on 2/25/2024 to document services personally performed by Meaghan Gutierrez MD based on my observations and the provider's statements to me.  2/25/2024   Meaghan Gutierrez MD Jonkman, Tracy Dianne, MD  02/25/24 1413

## 2024-02-25 NOTE — ED TRIAGE NOTES
Pt arrives via EMS from home with shortness of breath this morning, sinus symptoms past couple of days. Pt was 80% on RA upon EMS arrival with bilateral wheezes. 1 duoneb given, 2 albuterol nebs. Pt arrived on 10L NRB, BS: 166. Pt has known afib, currently RVR w/ HR: 140-160s. Pt also had emesis with about 25mL blood. BS: 166. Hx: Asthma.     Triage Assessment (Adult)       Row Name 02/25/24 0844          Triage Assessment    Airway WDL WDL     Additional Documentation Breath Sounds (Group)        Respiratory WDL    Respiratory WDL X;rhythm/pattern     Rhythm/Pattern, Respiratory labored;shortness of breath        Breath Sounds    Breath Sounds All Fields     All Lung Fields Breath Sounds Anterior:;coarse;crackles        Skin Circulation/Temperature WDL    Skin Circulation/Temperature WDL WDL        Cardiac WDL    Cardiac WDL X;rhythm     Pulse Rate & Regularity apical pulse irregular     Cardiac Rhythm Atrial fibrillation        Peripheral/Neurovascular WDL    Peripheral Neurovascular WDL WDL        Cognitive/Neuro/Behavioral WDL    Cognitive/Neuro/Behavioral WDL WDL

## 2024-02-25 NOTE — PHARMACY-ANTICOAGULATION SERVICE
Clinical Pharmacy - Warfarin Dosing Consult     Pharmacy has been consulted to manage this patient s warfarin therapy.  Indication: Atrial Fibrillation  Therapy Goal: INR 2-3  Warfarin Prior to Admission: Yes  Warfarin PTA Regimen: 5mg=MWF, 2.5mg=ROW  Significant drug interactions: zithromax    INR   Date Value Ref Range Status   02/25/2024 2.73 (H) 0.85 - 1.15 Final   02/25/2024 2.71 (H) 0.85 - 1.15 Final       Recommend warfarin 2.5 mg today.  Pharmacy will monitor Kaycee Fuchs daily and order warfarin doses to achieve specified goal.      Please contact pharmacy as soon as possible if the warfarin needs to be held for a procedure or if the warfarin goals change.

## 2024-02-25 NOTE — H&P
[facility]    History and Physical  Hospitalist       Date of Admission:  2/25/2024  Date of Service (when I saw the patient): 02/25/24    Assessment & Plan   Kaycee Fuchs is a 83 year old female patient with past medical history of asthma, atrial fibrillation, hyperlipidemia, GERD, history of recurrent respiratory tract infections, came to emergency room for evaluation for cough, shortness of breath and wheezing.  She states that she has been having nasal congestion for the past few days.  This morning she woke up with severe cough associated with fever and chills.  She also states that she had shortness of breath and wheezing.  He denies chest pain.  She also denies leg swelling.  Because of worsening of her symptoms she came to emergency room for evaluation.  On arrival to emergency room, her vital signs were checked and showed temperature 102.2, pulse 161, blood pressure 131/79, oxygen saturation 93% on 10 L.  Lab workup showed normal BMP, lactic acid 1.3.  Venous gas within normal range.  WBC 11.3, hemoglobin 13.4.  Chest x-ray showed increased opacity in the perihilar right upper lobe, small right pleural effusion.  He has cardiomegaly with mild pulmonary vascular congestion.  EKG showed atrial fibrillation with rapid ventricular response.   In emergency room, she was given nebs, IV ceftriaxone and azithromycin.  She was given prednisone 40 mg p.o. cardiology was consulted from emergency room for A-fib with RVR as her blood pressure is running on the low side. Cardiology recommended digoxin 250 mcg IV to be repeated in an hour.  Patient received 2 doses of digoxin 250 mcg IV in the emergency room.  She was admitted to the hospital for further management.    Acute hypoxic respiratory failure   Community acquired pneumonia   Asthma exacerbation   Patient states that she had nasal congestion for few days.  She developed a sudden shortness of breath, cough, fever and chills this morning.   She states that she had wheezes.  In the ER, she was noted to be febrile and tachycardic.  She was also hypoxic.  She was put on oxygen supplement.  Lab workup showed mild leukocytosis at 11.3.  Lactic acid 1.3.  Chest x-ray revealed evidence of right upper lobe perihilar opacity and small right pleural effusion.  Patient was started on ceftriaxone and azithromycin in emergency room.  Will continue antibiotics for currently acquired pneumonia.  Will put her on Xopenex nebs given her A-fib with RVR  -She was given prednisone 40 mg p.o. in the ER.  Will continue prednisone 40 mg daily.  -Resume Singulair, Advair    Atrial fibrillation with rapid ventricular response  -likely triggered by pneumonia and asthma exacerbation.  Anticoagulated with warfarin.  She is on diltiazem  mg daily PTA.  EKG showed A-fib with RVR.  Cardiology was consulted from ER and recommended digoxin IV given her low blood pressure.  She received digoxin 250 mcg IV x 2 doses.  -Will continue to monitor on telemetry  -Will resume diltiazem  mg daily with hold parameter  -INR therapeutic at 2.71.  Will consult pharmacy for Coumadin dosing    Hyperlipidemia  Will resume simvastatin    GERD  On omeprazole 20 mg daily at home.  Will order Protonix while inpatient    Will admit patient to telemetry floor  DVT Prophylaxis: Warfarin  Code Status: Full Code    Disposition: Expected discharge in 2 days     Latoya Crabtree MD    Primary Care Physician   Natalya Matthews    Chief Complaint   Cough, fever, shortness of breath     History is obtained from the patient    History of Present Illness   Kaycee Fuchs is a 83 year old female patient with past medical history of asthma, atrial fibrillation, hyperlipidemia, GERD, history of recurrent respiratory tract infections, came to emergency room for evaluation for cough, shortness of breath and wheezing.  She states that she has been having nasal congestion for the past few days.  This morning  she woke up with severe cough associated with fever and chills.  She also states that she had shortness of breath and wheezing.  He denies chest pain.  She also denies leg swelling.  Because of worsening of her symptoms she came to emergency room for evaluation.  On arrival to emergency room, her vital signs were checked and showed temperature 102.2, pulse 161, blood pressure 131/79, oxygen saturation 93% on 10 L.  Lab workup showed normal BMP, lactic acid 1.3.  Venous gas within normal range.  WBC 11.3, hemoglobin 13.4.  Chest x-ray showed increased opacity in the perihilar right upper lobe, small right pleural effusion.  He has cardiomegaly with mild pulmonary vascular congestion.  EKG showed atrial fibrillation with rapid ventricular response.   In emergency room, she was given nebs, IV ceftriaxone and azithromycin.  She was given prednisone 40 mg p.o. cardiology was consulted from emergency room for A-fib with RVR as her blood pressure is running on the low side.  Cardiology recommended digoxin 250 mcg IV to be repeated in an hour.  Patient received 2 doses of digoxin 250 mcg IV in the emergency room.  She was admitted to the hospital for further management.    Past Medical History    I have reviewed this patient's medical history and updated it with pertinent information if needed.   No past medical history on file.    Past Surgical History   I have reviewed this patient's surgical history and updated it with pertinent information if needed.  Past Surgical History:   Procedure Laterality Date    FOOT SURGERY      JOINT REPLACEMENT Right     hip       Prior to Admission Medications   Prior to Admission Medications   Prescriptions Last Dose Informant Patient Reported? Taking?   FLONASE 50 MCG/ACT NA SUSP 2/24/2024 at pm  Yes Yes   Sig: Spray 1 spray into both nostrils 2 times daily   ZYRTEC ALLERGY PO 2/24/2024  Yes Yes   Sig: Take 10 mg by mouth daily   amitriptyline (ELAVIL) 10 MG tablet 2/24/2024 at pm  Yes  "Yes   Sig: Take 30 mg by mouth every morning   diltiazem ER (DILT-XR) 180 MG 24 hr capsule 2/24/2024 at am  Yes Yes   Sig: Take 180 mg by mouth daily   fluconazole (DIFLUCAN) 100 MG tablet 2/23/2024  Yes Yes   Sig: Take 100 mg by mouth three times a week Sun, Wed, Fri   fluticasone-salmeterol (ADVAIR) 250-50 MCG/ACT inhaler 2/24/2024 at pm  Yes Yes   Sig: Inhale 1 puff into the lungs every 12 hours   gabapentin (NEURONTIN) 600 MG tablet 2/24/2024 at pm  Yes Yes   Sig: Take 1,200 mg by mouth 3 times daily   montelukast (SINGULAIR) 10 MG tablet 2/24/2024 at hs  Yes Yes   Sig: Take 10 mg by mouth at bedtime   omeprazole (PRILOSEC OTC) 20 MG EC tablet 2/24/2024 at pm  Yes Yes   Sig: Take 20 mg by mouth daily   simvastatin (ZOCOR) 10 MG tablet 2/24/2024 at pm  Yes Yes   Sig: Take 10 mg by mouth at bedtime   warfarin ANTICOAGULANT (COUMADIN) 5 MG tablet 2.5mg on 2/24/2024 at pm  Yes Yes   Sig: Take 2.5-5 mg by mouth daily 5mg MWF, 2.5mg rest of the week      Facility-Administered Medications: None     Allergies   Allergies   Allergen Reactions    Claritin [Loratadine]      Arthritic feeling in hands and feet    Codeine      \"Wide-awake\"    Morphine And Related     Zantac        Social History   I have reviewed this patient's social history and updated it with pertinent information if needed. Kaycee Fuchs  reports that she has never smoked. She does not have any smokeless tobacco history on file. She reports current alcohol use. She reports that she does not use drugs.    Family History   I have reviewed this patient's family history and updated it with pertinent information if needed.   Family History   Problem Relation Age of Onset    Anesthesia Reaction No family hx of        Review of Systems   The 10 point Review of Systems is negative other than noted in the HPI or here. Shortness of breath, cough.     Physical Exam   Temp: 98.6  F (37  C) Temp src: Oral BP: 100/67 Pulse: 112   Resp: 13 SpO2: 98 % O2 Device: " Nasal cannula Oxygen Delivery: 5 LPM  Vital Signs with Ranges  Temp:  [98.6  F (37  C)-102.2  F (39  C)] 98.6  F (37  C)  Pulse:  [112-181] 112  Resp:  [11-42] 13  BP: ()/(56-90) 100/67  SpO2:  [88 %-99 %] 98 %  0 lbs 0 oz    GEN:  Alert, oriented x 3, appears comfortable, NAD.  HEENT:  Normocephalic/atraumatic, no scleral icterus, no nasal discharge, mouth moist.  CV:  Regular rate and rhythm, no murmur or JVD.  S1 + S2 noted, no S3 or S4.  LUNGS:  Clear to auscultation bilaterally without rales/rhonchi/wheezing/retractions.  Symmetric chest rise on inhalation noted.  ABD:  Active bowel sounds, soft, non-tender/non-distended.  No rebound/guarding/rigidity.  EXT:  No edema or cyanosis.  Hands/feet warm to touch with good signs of peripheral perfusion.  No joint synovitis noted.  SKIN:  Dry to touch, no exanthems noted in the visualized areas.  NEURO:  Symmetric muscle strength, sensation to touch grossly intact.  No new focal deficits appreciated.    Data   Data reviewed today:  I personally reviewed no images or EKG's today.  Recent Labs   Lab 02/25/24  0941 02/25/24  0936   WBC  --  11.3*   HGB  --  13.4   MCV  --  97   PLT  --  239   INR 2.71*  --    NA  --  139   POTASSIUM  --  4.4   CHLORIDE  --  104   CO2  --  25   BUN  --  12.5   CR  --  0.91   ANIONGAP  --  10   MELISSA  --  8.9   GLC  --  142*   ALBUMIN  --  4.0   PROTTOTAL  --  6.9   BILITOTAL  --  0.5   ALKPHOS  --  60   ALT  --  13   AST  --  37       Recent Results (from the past 24 hour(s))   XR Chest Port 1 View    Narrative    EXAM: XR CHEST PORTABLE 1 VIEW  LOCATION: [facility]  DATE: 02/25/2024    INDICATION: Dyspnea.  COMPARISON: 10/06/2023.      Impression    IMPRESSION: Increased opacity in the perihilar right upper lobe. Small right pleural effusion. Stable atelectasis or scarring at the lung bases. Cardiomegaly with mild pulmonary vascular congestion.

## 2024-02-25 NOTE — ED NOTES
"[facility]  ED Nurse Handoff Report    ED Chief complaint: Shortness of Breath and Asthma Exacerbation  . ED Diagnosis:   Final diagnoses:   Sepsis with acute hypoxic respiratory failure without septic shock, due to unspecified organism (H)   Pneumonia of right lung due to infectious organism, unspecified part of lung   Asthma with acute exacerbation, unspecified asthma severity, unspecified whether persistent   Atrial fibrillation with RVR (H)       Allergies:   Allergies   Allergen Reactions    Claritin [Loratadine]      Arthritic feeling in hands and feet    Codeine      \"Wide-awake\"    Morphine And Related     Zantac        Code Status: Full Code    Activity level - Baseline/Home:  independent.  Activity Level - Current:   standby and walker.   Lift room needed: No.   Bariatric: No   Needed: No   Isolation: No.   Infection: Not Applicable.     Respiratory status: Nasal cannula    Vital Signs (within 30 minutes):   Vitals:    02/25/24 1220 02/25/24 1230 02/25/24 1231 02/25/24 1245   BP: 91/66 91/71 91/71 99/72   Pulse: (!) 140 114 (!) 135 (!) 130   Resp: 22 19 16 14   Temp:       TempSrc:       SpO2: 97% 97% 96% 93%       Cardiac Rhythm:  ,   Cardiac  Cardiac Rhythm: Atrial fibrillation  Pain level:    Patient confused: No.   Patient Falls Risk: activity supervised.   Elimination Status: Has voided     Patient Report - Initial Complaint: Kaycee Fuchs is a 83 year old female with a history of atrial fibrillation and asthma presenting with shortness of breath and asthma exacerbation. Patient has been experiencing sinus symptoms the past few days. She woke up this morning with severe cough at 0400. She reports vomiting up mucus and blood. Kaycee was experiencing chest tightness earlier, but notes this has resolved since receiving 1 duoneb and 2 albuterol nebs en route. Patient endorses chills and fever. Denies increased swelling or abdominal pain. No history of smoking.    . "   Focused Assessment:   RespiratoryAirway WDL: WDL  Respiratory WDLRespiratory WDL: .WDL except; rhythm/patternRhythm/Pattern, Respiratory: shortness of breath  Breath SoundsBreath Sounds: All FieldsAll Lung Fields Breath Sounds: Anterior:; Posterior:; diminished; crackles, coarse ML          1000  Cardiac  Cardiac (Adult)Cardiac WDL: .WDL except; rhythmPulse Rate & Regularity: apical pulse irregularCardiac Rhythm: Atrial fibrillation ML       1000  Neuro Cognitive  Neuro CognitiveCognitive/Neuro/Behavioral WDL: WDL ML       1000  Skin  SkinSkin WDL: .WDL except; temperature; moistureSkin Temperature: hotSkin Moisture: moist; diaphoretic          Abnormal Results:   Labs Ordered and Resulted from Time of ED Arrival to Time of ED Departure   COMPREHENSIVE METABOLIC PANEL - Abnormal       Result Value    Sodium 139      Potassium 4.4      Carbon Dioxide (CO2) 25      Anion Gap 10      Urea Nitrogen 12.5      Creatinine 0.91      GFR Estimate 62      Calcium 8.9      Chloride 104      Glucose 142 (*)     Alkaline Phosphatase 60      AST 37      ALT 13      Protein Total 6.9      Albumin 4.0      Bilirubin Total 0.5     BLOOD GAS VENOUS - Abnormal    pH Venous 7.43      pCO2 Venous 40      pO2 Venous 44      Bicarbonate Venous 27      Base Excess/Deficit Venous 2.3      FIO2 40      Oxyhemoglobin Venous 80 (*)     O2 Sat, Venous 82.1 (*)    CBC WITH PLATELETS AND DIFFERENTIAL - Abnormal    WBC Count 11.3 (*)     RBC Count 4.14      Hemoglobin 13.4      Hematocrit 40.2      MCV 97      MCH 32.4      MCHC 33.3      RDW 13.3      Platelet Count 239      % Neutrophils 92      % Lymphocytes 6      % Monocytes 2      % Eosinophils 0      % Basophils 0      % Immature Granulocytes 0      NRBCs per 100 WBC 0      Absolute Neutrophils 10.3 (*)     Absolute Lymphocytes 0.6 (*)     Absolute Monocytes 0.3      Absolute Eosinophils 0.1      Absolute Basophils 0.0      Absolute Immature Granulocytes 0.0      Absolute NRBCs 0.0      INR - Abnormal    INR 2.71 (*)    LACTIC ACID WHOLE BLOOD - Normal    Lactic Acid 1.3     INFLUENZA A/B, RSV, & SARS-COV2 PCR - Normal    Influenza A PCR Negative      Influenza B PCR Negative      RSV PCR Negative      SARS CoV2 PCR Negative     LACTIC ACID WHOLE BLOOD - Normal    Lactic Acid 1.0     BLOOD CULTURE   BLOOD CULTURE        XR Chest Port 1 View   Final Result   IMPRESSION: Increased opacity in the perihilar right upper lobe. Small right pleural effusion. Stable atelectasis or scarring at the lung bases. Cardiomegaly with mild pulmonary vascular congestion.             Treatments provided: See MAR  Family Comments: Pt will contact  OBS brochure/video discussed/provided to patient:  N/A  ED Medications:   Medications   acetaminophen (TYLENOL) tablet 1,000 mg (1,000 mg Oral $Given 2/25/24 0934)   ipratropium - albuterol 0.5 mg/2.5 mg/3 mL (DUONEB) 0.5-2.5 (3) MG/3ML neb solution (  Not Given 2/25/24 1241)   ipratropium - albuterol 0.5 mg/2.5 mg/3 mL (DUONEB) neb solution 3 mL (3 mLs Nebulization $Given 2/25/24 0945)   predniSONE (DELTASONE) tablet 40 mg (40 mg Oral $Given 2/25/24 1017)   sodium chloride 0.9% BOLUS 500 mL (0 mLs Intravenous Stopped 2/25/24 1126)   cefTRIAXone (ROCEPHIN) 2 g vial to attach to  ml bag for ADULTS or NS 50 ml bag for PEDS (0 g Intravenous Stopped 2/25/24 1057)   azithromycin 500 mg (ZITHROMAX) in 0.9% NaCl 250 mL intermittent infusion 500 mg (500 mg Intravenous $New Bag 2/25/24 1056)   digoxin (LANOXIN) injection 250 mcg (250 mcg Intravenous $Given 2/25/24 1247)       Drips infusing:  No  For the majority of the shift this patient was Green.   Interventions performed were NA.    Sepsis treatment initiated: Yes    Per the ED Provider, Time Zero for severe sepsis or septic shock is:  0906    3 Hour Severe Sepsis Bundle Completion:  1. Initial Lactic Acid Result:   Recent Labs   Lab Test 02/25/24  1208 02/25/24  0936 10/05/23  2329   LACT 1.0 1.3 0.9     2. Blood Cultures  before Antibiotics: Yes  3. Broad Spectrum Antibiotics Administered:     Anti-infectives (From now, onward)      None          4. 500 ml of IV fluids have been given so far      6 Hour Severe Sepsis Bundle Completion:    1. Repeat Lactic Acid Level: Last result   Lab Results   Component Value Date    LACT 1.0 02/25/2024     2. Patient currently on Vasopressors =  No    Cares/treatment/interventions/medications to be completed following ED care: Fever mgmt, monitor HR/hemodynamics, monitor respiratory status, nebs PRN.    ED Nurse Name: Bianca Alaniz RN  1:01 PM     RECEIVING UNIT ED HANDOFF REVIEW    Above ED Nurse Handoff Report was reviewed: Yes  Reviewed by: Karyn Haney RN on February 25, 2024 at 1:43 PM   LIYAH Patterson called the ED to inform them the note was read: Yes

## 2024-02-26 ENCOUNTER — APPOINTMENT (OUTPATIENT)
Dept: CARDIOLOGY | Facility: CLINIC | Age: 84
DRG: 871 | End: 2024-02-26
Attending: INTERNAL MEDICINE
Payer: MEDICARE

## 2024-02-26 LAB
ANION GAP SERPL CALCULATED.3IONS-SCNC: 7 MMOL/L (ref 7–15)
ATRIAL RATE - MUSE: NORMAL BPM
BASOPHILS # BLD AUTO: 0 10E3/UL (ref 0–0.2)
BASOPHILS NFR BLD AUTO: 0 %
BUN SERPL-MCNC: 16 MG/DL (ref 8–23)
CALCIUM SERPL-MCNC: 9 MG/DL (ref 8.8–10.2)
CHLORIDE SERPL-SCNC: 107 MMOL/L (ref 98–107)
CREAT SERPL-MCNC: 0.85 MG/DL (ref 0.51–0.95)
DEPRECATED HCO3 PLAS-SCNC: 26 MMOL/L (ref 22–29)
DIASTOLIC BLOOD PRESSURE - MUSE: NORMAL MMHG
EGFRCR SERPLBLD CKD-EPI 2021: 68 ML/MIN/1.73M2
EOSINOPHIL # BLD AUTO: 0.1 10E3/UL (ref 0–0.7)
EOSINOPHIL NFR BLD AUTO: 1 %
ERYTHROCYTE [DISTWIDTH] IN BLOOD BY AUTOMATED COUNT: 13.7 % (ref 10–15)
GLUCOSE SERPL-MCNC: 88 MG/DL (ref 70–99)
HCT VFR BLD AUTO: 35.1 % (ref 35–47)
HGB BLD-MCNC: 11.3 G/DL (ref 11.7–15.7)
IMM GRANULOCYTES # BLD: 0.1 10E3/UL
IMM GRANULOCYTES NFR BLD: 0 %
INR PPP: 3.49 (ref 0.85–1.15)
INTERPRETATION ECG - MUSE: NORMAL
LVEF ECHO: NORMAL
LYMPHOCYTES # BLD AUTO: 1.8 10E3/UL (ref 0.8–5.3)
LYMPHOCYTES NFR BLD AUTO: 12 %
MCH RBC QN AUTO: 31.7 PG (ref 26.5–33)
MCHC RBC AUTO-ENTMCNC: 32.2 G/DL (ref 31.5–36.5)
MCV RBC AUTO: 99 FL (ref 78–100)
MONOCYTES # BLD AUTO: 0.8 10E3/UL (ref 0–1.3)
MONOCYTES NFR BLD AUTO: 6 %
NEUTROPHILS # BLD AUTO: 12.4 10E3/UL (ref 1.6–8.3)
NEUTROPHILS NFR BLD AUTO: 81 %
NRBC # BLD AUTO: 0 10E3/UL
NRBC BLD AUTO-RTO: 0 /100
P AXIS - MUSE: NORMAL DEGREES
PLATELET # BLD AUTO: 229 10E3/UL (ref 150–450)
POTASSIUM SERPL-SCNC: 3.9 MMOL/L (ref 3.4–5.3)
PR INTERVAL - MUSE: NORMAL MS
QRS DURATION - MUSE: 72 MS
QT - MUSE: 222 MS
QTC - MUSE: 371 MS
R AXIS - MUSE: 78 DEGREES
RBC # BLD AUTO: 3.56 10E6/UL (ref 3.8–5.2)
SODIUM SERPL-SCNC: 140 MMOL/L (ref 135–145)
SYSTOLIC BLOOD PRESSURE - MUSE: NORMAL MMHG
T AXIS - MUSE: 240 DEGREES
VENTRICULAR RATE- MUSE: 168 BPM
WBC # BLD AUTO: 15.3 10E3/UL (ref 4–11)

## 2024-02-26 PROCEDURE — 120N000001 HC R&B MED SURG/OB

## 2024-02-26 PROCEDURE — 250N000009 HC RX 250: Performed by: INTERNAL MEDICINE

## 2024-02-26 PROCEDURE — 80048 BASIC METABOLIC PNL TOTAL CA: CPT | Performed by: INTERNAL MEDICINE

## 2024-02-26 PROCEDURE — 36415 COLL VENOUS BLD VENIPUNCTURE: CPT | Performed by: INTERNAL MEDICINE

## 2024-02-26 PROCEDURE — 258N000003 HC RX IP 258 OP 636: Performed by: INTERNAL MEDICINE

## 2024-02-26 PROCEDURE — 85610 PROTHROMBIN TIME: CPT | Performed by: INTERNAL MEDICINE

## 2024-02-26 PROCEDURE — 250N000013 HC RX MED GY IP 250 OP 250 PS 637: Performed by: INTERNAL MEDICINE

## 2024-02-26 PROCEDURE — 250N000012 HC RX MED GY IP 250 OP 636 PS 637: Performed by: INTERNAL MEDICINE

## 2024-02-26 PROCEDURE — 93306 TTE W/DOPPLER COMPLETE: CPT | Mod: 26 | Performed by: INTERNAL MEDICINE

## 2024-02-26 PROCEDURE — 99233 SBSQ HOSP IP/OBS HIGH 50: CPT | Performed by: INTERNAL MEDICINE

## 2024-02-26 PROCEDURE — 93306 TTE W/DOPPLER COMPLETE: CPT

## 2024-02-26 PROCEDURE — 250N000013 HC RX MED GY IP 250 OP 250 PS 637: Performed by: HOSPITALIST

## 2024-02-26 PROCEDURE — 85004 AUTOMATED DIFF WBC COUNT: CPT | Performed by: INTERNAL MEDICINE

## 2024-02-26 PROCEDURE — 250N000011 HC RX IP 250 OP 636: Performed by: INTERNAL MEDICINE

## 2024-02-26 RX ORDER — FLUCONAZOLE 100 MG/1
100 TABLET ORAL
Status: DISCONTINUED | OUTPATIENT
Start: 2024-02-26 | End: 2024-02-29 | Stop reason: HOSPADM

## 2024-02-26 RX ADMIN — FLUTICASONE FUROATE AND VILANTEROL TRIFENATATE 1 PUFF: 100; 25 POWDER RESPIRATORY (INHALATION) at 08:44

## 2024-02-26 RX ADMIN — PREDNISONE 40 MG: 20 TABLET ORAL at 08:42

## 2024-02-26 RX ADMIN — LEVALBUTEROL HYDROCHLORIDE 1.25 MG: 1.25 SOLUTION RESPIRATORY (INHALATION) at 19:41

## 2024-02-26 RX ADMIN — FLUCONAZOLE 100 MG: 100 TABLET ORAL at 08:54

## 2024-02-26 RX ADMIN — SIMVASTATIN 10 MG: 10 TABLET, FILM COATED ORAL at 21:04

## 2024-02-26 RX ADMIN — AZITHROMYCIN MONOHYDRATE 500 MG: 500 INJECTION, POWDER, LYOPHILIZED, FOR SOLUTION INTRAVENOUS at 10:47

## 2024-02-26 RX ADMIN — GABAPENTIN 1200 MG: 600 TABLET, FILM COATED ORAL at 08:41

## 2024-02-26 RX ADMIN — BENZONATATE 100 MG: 100 CAPSULE ORAL at 10:47

## 2024-02-26 RX ADMIN — MONTELUKAST 10 MG: 10 TABLET, FILM COATED ORAL at 21:04

## 2024-02-26 RX ADMIN — DILTIAZEM HYDROCHLORIDE 180 MG: 180 CAPSULE, COATED, EXTENDED RELEASE ORAL at 08:42

## 2024-02-26 RX ADMIN — GABAPENTIN 1200 MG: 600 TABLET, FILM COATED ORAL at 16:00

## 2024-02-26 RX ADMIN — LEVALBUTEROL HYDROCHLORIDE 1.25 MG: 1.25 SOLUTION RESPIRATORY (INHALATION) at 15:27

## 2024-02-26 RX ADMIN — GABAPENTIN 1200 MG: 600 TABLET, FILM COATED ORAL at 21:04

## 2024-02-26 RX ADMIN — AMITRIPTYLINE HYDROCHLORIDE 30 MG: 25 TABLET, FILM COATED ORAL at 08:42

## 2024-02-26 RX ADMIN — CETIRIZINE HYDROCHLORIDE 10 MG: 10 TABLET, FILM COATED ORAL at 08:41

## 2024-02-26 RX ADMIN — WARFARIN SODIUM 0.5 MG: 1 TABLET ORAL at 18:46

## 2024-02-26 RX ADMIN — PANTOPRAZOLE SODIUM 20 MG: 20 TABLET, DELAYED RELEASE ORAL at 16:01

## 2024-02-26 RX ADMIN — CEFTRIAXONE 2 G: 2 INJECTION, POWDER, FOR SOLUTION INTRAMUSCULAR; INTRAVENOUS at 08:40

## 2024-02-26 ASSESSMENT — ACTIVITIES OF DAILY LIVING (ADL)
ADLS_ACUITY_SCORE: 25
ADLS_ACUITY_SCORE: 29
ADLS_ACUITY_SCORE: 29
ADLS_ACUITY_SCORE: 25
ADLS_ACUITY_SCORE: 29
ADLS_ACUITY_SCORE: 25
ADLS_ACUITY_SCORE: 29
ADLS_ACUITY_SCORE: 25
ADLS_ACUITY_SCORE: 29
ADLS_ACUITY_SCORE: 29
ADLS_ACUITY_SCORE: 25
ADLS_ACUITY_SCORE: 29
ADLS_ACUITY_SCORE: 25
ADLS_ACUITY_SCORE: 29
ADLS_ACUITY_SCORE: 25
ADLS_ACUITY_SCORE: 25

## 2024-02-26 NOTE — PLAN OF CARE
Goal Outcome Evaluation:      Plan of Care Reviewed With: patient        Pt. A&Ox4, up with SBA and walker, Tele: A.Fib with RVR 90's to 100's. Lung sounds diminished, coarse crackles and exp. Wheezes., dry cough present, tessalon pearls were ordered and throat lozenges also added. Pt. Encouraged to use I.S. O2 weaned down to 2L with sat's 93-94%. PRN Xopenex neb given for wheezes. Pt. Denies any needs at this time. Will continue with POC.

## 2024-02-26 NOTE — PLAN OF CARE
Goal Outcome Evaluation:      Plan of Care Reviewed With: patient    Overall Patient Progress: improvingOverall Patient Progress: improving     Temp: 98.3  F (36.8  C) Temp src: Oral BP: 120/67 Pulse: 94   Resp: 20 SpO2: 96 % O2 Device: Nasal cannula Oxygen Delivery: 4 LPM     A/O4. Up SBA w walker. Denies pain, SOB. Tele Afib RVR. K, AM redraw. 2 PIV SL. On IV abx. Bed alarm on.

## 2024-02-27 LAB
ANION GAP SERPL CALCULATED.3IONS-SCNC: 8 MMOL/L (ref 7–15)
BASOPHILS # BLD AUTO: 0.1 10E3/UL (ref 0–0.2)
BASOPHILS NFR BLD AUTO: 0 %
BUN SERPL-MCNC: 15.6 MG/DL (ref 8–23)
CALCIUM SERPL-MCNC: 9 MG/DL (ref 8.8–10.2)
CHLORIDE SERPL-SCNC: 105 MMOL/L (ref 98–107)
CREAT SERPL-MCNC: 0.86 MG/DL (ref 0.51–0.95)
DEPRECATED HCO3 PLAS-SCNC: 27 MMOL/L (ref 22–29)
EGFRCR SERPLBLD CKD-EPI 2021: 67 ML/MIN/1.73M2
EOSINOPHIL # BLD AUTO: 0.2 10E3/UL (ref 0–0.7)
EOSINOPHIL NFR BLD AUTO: 1 %
ERYTHROCYTE [DISTWIDTH] IN BLOOD BY AUTOMATED COUNT: 13.9 % (ref 10–15)
GLUCOSE SERPL-MCNC: 107 MG/DL (ref 70–99)
HCT VFR BLD AUTO: 34.6 % (ref 35–47)
HGB BLD-MCNC: 11.4 G/DL (ref 11.7–15.7)
IMM GRANULOCYTES # BLD: 0.1 10E3/UL
IMM GRANULOCYTES NFR BLD: 1 %
INR PPP: 2.65 (ref 0.85–1.15)
LYMPHOCYTES # BLD AUTO: 1.9 10E3/UL (ref 0.8–5.3)
LYMPHOCYTES NFR BLD AUTO: 12 %
MCH RBC QN AUTO: 32.9 PG (ref 26.5–33)
MCHC RBC AUTO-ENTMCNC: 32.9 G/DL (ref 31.5–36.5)
MCV RBC AUTO: 100 FL (ref 78–100)
MONOCYTES # BLD AUTO: 0.9 10E3/UL (ref 0–1.3)
MONOCYTES NFR BLD AUTO: 6 %
NEUTROPHILS # BLD AUTO: 12 10E3/UL (ref 1.6–8.3)
NEUTROPHILS NFR BLD AUTO: 80 %
NRBC # BLD AUTO: 0 10E3/UL
NRBC BLD AUTO-RTO: 0 /100
PLATELET # BLD AUTO: 233 10E3/UL (ref 150–450)
POTASSIUM SERPL-SCNC: 3.6 MMOL/L (ref 3.4–5.3)
RBC # BLD AUTO: 3.46 10E6/UL (ref 3.8–5.2)
SODIUM SERPL-SCNC: 140 MMOL/L (ref 135–145)
WBC # BLD AUTO: 15 10E3/UL (ref 4–11)

## 2024-02-27 PROCEDURE — 250N000011 HC RX IP 250 OP 636: Performed by: INTERNAL MEDICINE

## 2024-02-27 PROCEDURE — 250N000013 HC RX MED GY IP 250 OP 250 PS 637: Performed by: INTERNAL MEDICINE

## 2024-02-27 PROCEDURE — 120N000001 HC R&B MED SURG/OB

## 2024-02-27 PROCEDURE — 36415 COLL VENOUS BLD VENIPUNCTURE: CPT | Performed by: INTERNAL MEDICINE

## 2024-02-27 PROCEDURE — 250N000013 HC RX MED GY IP 250 OP 250 PS 637: Performed by: HOSPITALIST

## 2024-02-27 PROCEDURE — 258N000003 HC RX IP 258 OP 636: Performed by: INTERNAL MEDICINE

## 2024-02-27 PROCEDURE — 80048 BASIC METABOLIC PNL TOTAL CA: CPT | Performed by: INTERNAL MEDICINE

## 2024-02-27 PROCEDURE — 85025 COMPLETE CBC W/AUTO DIFF WBC: CPT | Performed by: INTERNAL MEDICINE

## 2024-02-27 PROCEDURE — 94640 AIRWAY INHALATION TREATMENT: CPT

## 2024-02-27 PROCEDURE — 999N000157 HC STATISTIC RCP TIME EA 10 MIN

## 2024-02-27 PROCEDURE — 85610 PROTHROMBIN TIME: CPT | Performed by: INTERNAL MEDICINE

## 2024-02-27 PROCEDURE — 250N000012 HC RX MED GY IP 250 OP 636 PS 637: Performed by: INTERNAL MEDICINE

## 2024-02-27 PROCEDURE — 99233 SBSQ HOSP IP/OBS HIGH 50: CPT | Performed by: INTERNAL MEDICINE

## 2024-02-27 RX ORDER — WARFARIN SODIUM 2.5 MG/1
2.5 TABLET ORAL
Status: COMPLETED | OUTPATIENT
Start: 2024-02-27 | End: 2024-02-27

## 2024-02-27 RX ORDER — AZITHROMYCIN 250 MG/1
250 TABLET, FILM COATED ORAL DAILY
Status: DISCONTINUED | OUTPATIENT
Start: 2024-02-28 | End: 2024-02-29 | Stop reason: HOSPADM

## 2024-02-27 RX ADMIN — PANTOPRAZOLE SODIUM 20 MG: 20 TABLET, DELAYED RELEASE ORAL at 17:14

## 2024-02-27 RX ADMIN — GABAPENTIN 1200 MG: 600 TABLET, FILM COATED ORAL at 17:14

## 2024-02-27 RX ADMIN — CEFTRIAXONE 2 G: 2 INJECTION, POWDER, FOR SOLUTION INTRAMUSCULAR; INTRAVENOUS at 09:21

## 2024-02-27 RX ADMIN — MONTELUKAST 10 MG: 10 TABLET, FILM COATED ORAL at 21:14

## 2024-02-27 RX ADMIN — DILTIAZEM HYDROCHLORIDE 180 MG: 180 CAPSULE, COATED, EXTENDED RELEASE ORAL at 09:23

## 2024-02-27 RX ADMIN — AMITRIPTYLINE HYDROCHLORIDE 30 MG: 25 TABLET, FILM COATED ORAL at 09:22

## 2024-02-27 RX ADMIN — FLUTICASONE FUROATE AND VILANTEROL TRIFENATATE 1 PUFF: 100; 25 POWDER RESPIRATORY (INHALATION) at 08:56

## 2024-02-27 RX ADMIN — BENZONATATE 100 MG: 100 CAPSULE ORAL at 10:21

## 2024-02-27 RX ADMIN — GABAPENTIN 1200 MG: 600 TABLET, FILM COATED ORAL at 21:14

## 2024-02-27 RX ADMIN — WARFARIN SODIUM 2.5 MG: 2.5 TABLET ORAL at 17:14

## 2024-02-27 RX ADMIN — BENZONATATE 100 MG: 100 CAPSULE ORAL at 17:13

## 2024-02-27 RX ADMIN — SIMVASTATIN 10 MG: 10 TABLET, FILM COATED ORAL at 21:13

## 2024-02-27 RX ADMIN — PREDNISONE 40 MG: 20 TABLET ORAL at 09:22

## 2024-02-27 RX ADMIN — AZITHROMYCIN MONOHYDRATE 500 MG: 500 INJECTION, POWDER, LYOPHILIZED, FOR SOLUTION INTRAVENOUS at 10:19

## 2024-02-27 RX ADMIN — CETIRIZINE HYDROCHLORIDE 10 MG: 10 TABLET, FILM COATED ORAL at 09:22

## 2024-02-27 RX ADMIN — GABAPENTIN 1200 MG: 600 TABLET, FILM COATED ORAL at 09:22

## 2024-02-27 ASSESSMENT — ACTIVITIES OF DAILY LIVING (ADL)
ADLS_ACUITY_SCORE: 31
ADLS_ACUITY_SCORE: 24
ADLS_ACUITY_SCORE: 31
ADLS_ACUITY_SCORE: 24
ADLS_ACUITY_SCORE: 31

## 2024-02-27 NOTE — PLAN OF CARE
"/54 (BP Location: Left arm)   Pulse 110   Temp 98.6  F (37  C) (Oral)   Resp 16   Ht 1.651 m (5' 5\")   Wt 90.4 kg (199 lb 6.4 oz)   SpO2 97%   BMI 33.18 kg/m      VSS, PT tolerating RA. PT denies pain, SoB, but does endorse [facility]. LS dim. Potential discharge tomorrow.  "

## 2024-02-27 NOTE — PROGRESS NOTES
[facility]    Medicine Progress Note - Hospitalist Service    Date of Admission:  2/25/2024    Assessment & Plan   Kaycee Fuchs is a 83 year old female patient with past medical history of asthma, atrial fibrillation, hyperlipidemia, GERD, history of recurrent respiratory tract infections, came to emergency room for evaluation for cough, shortness of breath and wheezing.  She states that she has been having nasal congestion for the past few days.  This morning she woke up with severe cough associated with fever and chills.  She also states that she had shortness of breath and wheezing.  He denies chest pain.  She also denies leg swelling.  Because of worsening of her symptoms she came to emergency room for evaluation.  On arrival to emergency room, her vital signs were checked and showed temperature 102.2, pulse 161, blood pressure 131/79, oxygen saturation 93% on 10 L.  Lab workup showed normal BMP, lactic acid 1.3.  Venous gas within normal range.  WBC 11.3, hemoglobin 13.4.  Chest x-ray showed increased opacity in the perihilar right upper lobe, small right pleural effusion.  He has cardiomegaly with mild pulmonary vascular congestion.  EKG showed atrial fibrillation with rapid ventricular response.   In emergency room, she was given nebs, IV ceftriaxone and azithromycin.  She was given prednisone 40 mg p.o. cardiology was consulted from emergency room for A-fib with RVR as her blood pressure is running on the low side. Cardiology recommended digoxin 250 mcg IV to be repeated in an hour.  Patient received 2 doses of digoxin 250 mcg IV in the emergency room.  She was admitted to the hospital for further management.    Acute hypoxic respiratory failure   Sepsis with presenting fever, tachycardia, leukocytosis with hypoxia secondary to underlying suspected bacterial right-sided community-acquired pneumonia  Asthma exacerbation with history of mild intermittent bronchial asthma    -Patient  states that she had nasal congestion for few days.  She developed a sudden shortness of breath, cough, fever and chills this morning.  She states that she had wheezes.  -In the ER, she was noted to be febrile and tachycardic.  She was also hypoxic.  She was put on oxygen supplement.  Lab workup showed mild leukocytosis at 11.3.  Lactic acid 1.3.    -Chest x-ray revealed evidence of right upper lobe perihilar opacity and small right pleural effusion.  -Continue to antibiotics currently on ceftriaxone and Zithromax.  -Echocardiogram showed reassuring findings with preserved EF and no significant valvular abnormalities  Will put her on Xopenex nebs given her A-fib with RVR  -She was given prednisone 40 mg p.o. in the ER.  Will continue prednisone 40 mg daily.  Likely can discharge on lower dose of prednisone  -Resume Singulair, Advair  -Also resume her candidiasis prophylaxis of Diflucan that she takes ever since she has been placed on Advair    Atrial fibrillation with rapid ventricular response  -likely triggered by pneumonia and asthma exacerbation.  Anticoagulated with warfarin.  She is on diltiazem  mg daily PTA.  EKG showed A-fib with RVR.  Cardiology was consulted from ER and recommended digoxin IV given her low blood pressure.  She received digoxin 250 mcg IV x 2 doses.  -Will continue to monitor on telemetry  -Will resume diltiazem  mg daily with hold parameter  -No issues of tachyarrhythmias overnight.  Remained on CVR  -Can discontinue telemonitoring  -Therapeutic INR    Hyperlipidemia  Will resume simvastatin    GERD  Remain on PPI-            Diet: Combination Diet Regular Diet Adult    DVT Prophylaxis: Warfarin  Shearer Catheter: Not present  Lines: None     Cardiac Monitoring: ACTIVE order. Indication: Tachyarrhythmias, acute (48 hours)  Code Status: Full Code      Clinically Significant Risk Factors                         # Obesity: Estimated body mass index is 33.18 kg/m  as calculated from  "the following:    Height as of this encounter: 1.651 m (5' 5\").    Weight as of this encounter: 90.4 kg (199 lb 6.4 oz)., PRESENT ON ADMISSION     # Asthma: noted on problem list        Disposition Plan     Expected Discharge Date:               Likely she will be a good candidate for hospital discharge in the next 24 hours      Yonathan Bernardo MD, MD  Hospitalist Service  [facility]  Securely message with [employer] (more info)  Text page via [employer] Paging/Directory   ______________________________________________________________________    Interval History   Continuing medicine service care today.  Seen and examined.  Chart reviewed.  I met this very pleasant lady while she is laying comfortably in bed.  Kaycee remained to be in good spirits this she thinks she is improving.  She was taken off from oxygen support earlier and still maintaining good oxygen level.  No significant mental status changes reported.  Tolerating oral diet.  She is endorsing no nausea or vomiting.  No reported diarrhea.   Ambulating more and denies any symptomatology of dizziness or lightheadedness.   Still having some coughing spells      Physical Exam   Vital Signs: Temp: 98.6  F (37  C) Temp src: Oral BP: 108/54 Pulse: 110   Resp: 16 SpO2: 97 % O2 Device: Nasal cannula Oxygen Delivery: 2 LPM  Weight: 199 lbs 6.4 oz    HEENT; Atraumatic, normocephalic, pinkish conjuctiva, pupils bilateral reactive   Skin: warm and moist, no rashes  Lymphatics: no cervical or axillary lymphandenopathy  Lungs: Fair air entry, scant wheezes, no crackles  Heart: normal rate, normal rhythm, no rubs or gallops.   Abdomen: normal bowel sounds, no tenderness, no peritoneal signs, no guarding  Extremities: no deformities, chronic hyperpigmented skin lower extremities, bilateral nonpitting lower extremity edema   Neuro; follow commands, alert and oriented x3, spontaneous speech, coherent, moves all extremities spontaneously  Psych; no " hallucination, euthymic mood, not agitated      Medical Decision Making       45 MINUTES SPENT BY ME on the date of service doing chart review, history, exam, documentation & further activities per the note.  MANAGEMENT DISCUSSED with the following over the past 24 hours: yes   NOTE(S)/MEDICAL RECORDS REVIEWED over the past 24 hours: yes       Data     I have personally reviewed the following data over the past 24 hrs:    15.0 (H)  \   11.4 (L)   / 233     140 105 15.6 /  107 (H)   3.6 27 0.86 \     INR:  2.65 (H) PTT:  N/A   D-dimer:  N/A Fibrinogen:  N/A       Imaging results reviewed over the past 24 hrs:   No results found for this or any previous visit (from the past 24 hour(s)).

## 2024-02-27 NOTE — PLAN OF CARE
Goal Outcome Evaluation:       The patient remained AxOx4 throughout the shift. Remained on tele in afib with RVR 80s-100s. IV abx given, no issues. PIVs remain patent and intact. K protocol completed per orders- 3.9 lab value with replacement ordered for tomorrow. Ambulates SBA. Mild SOB with ambulation. Was on 2 L NC throughout the shift between 92-95%. PRN neb given this afternoon for congestion/wheezes in lungs. No pain reported.

## 2024-02-27 NOTE — CARE PLAN
"/65 (BP Location: Left arm)   Pulse 61   Temp 97.3  F (36.3  C) (Oral)   Resp 20   Ht 1.651 m (5' 5\")   Wt 90.4 kg (199 lb 6.4 oz)   SpO2 95%   BMI 33.18 kg/m      Neuro: A/O X4  Cardiac: A-fib CVR  Lungs: 2L N.C.  GI: WDL  : WDL  Pain: 0/10  IV: RPIV and LPIV Saline Lock  Meds: Neb, Gabapentin, Montelukast, Simvastatin  Labs/tests: K protocol  Diet: Reg  Activity: SBA  Plan: Inpatient care 1-2 days, possible discharge 02/28/2024  "

## 2024-02-28 LAB
INR PPP: 2.18 (ref 0.85–1.15)
POTASSIUM SERPL-SCNC: 3.8 MMOL/L (ref 3.4–5.3)

## 2024-02-28 PROCEDURE — 120N000001 HC R&B MED SURG/OB

## 2024-02-28 PROCEDURE — 999N000157 HC STATISTIC RCP TIME EA 10 MIN

## 2024-02-28 PROCEDURE — 99233 SBSQ HOSP IP/OBS HIGH 50: CPT | Performed by: INTERNAL MEDICINE

## 2024-02-28 PROCEDURE — 36415 COLL VENOUS BLD VENIPUNCTURE: CPT | Performed by: INTERNAL MEDICINE

## 2024-02-28 PROCEDURE — 94640 AIRWAY INHALATION TREATMENT: CPT | Mod: 76

## 2024-02-28 PROCEDURE — 250N000011 HC RX IP 250 OP 636: Performed by: INTERNAL MEDICINE

## 2024-02-28 PROCEDURE — 94640 AIRWAY INHALATION TREATMENT: CPT

## 2024-02-28 PROCEDURE — 250N000013 HC RX MED GY IP 250 OP 250 PS 637: Performed by: INTERNAL MEDICINE

## 2024-02-28 PROCEDURE — 250N000009 HC RX 250: Performed by: INTERNAL MEDICINE

## 2024-02-28 PROCEDURE — 84132 ASSAY OF SERUM POTASSIUM: CPT | Performed by: INTERNAL MEDICINE

## 2024-02-28 PROCEDURE — 250N000012 HC RX MED GY IP 250 OP 636 PS 637: Performed by: INTERNAL MEDICINE

## 2024-02-28 PROCEDURE — 85610 PROTHROMBIN TIME: CPT | Performed by: INTERNAL MEDICINE

## 2024-02-28 RX ORDER — ALBUTEROL SULFATE 0.83 MG/ML
2.5 SOLUTION RESPIRATORY (INHALATION)
Status: DISCONTINUED | OUTPATIENT
Start: 2024-02-28 | End: 2024-02-29 | Stop reason: HOSPADM

## 2024-02-28 RX ORDER — PREDNISONE 20 MG/1
TABLET ORAL
Qty: 7 TABLET | Refills: 0 | Status: SHIPPED | OUTPATIENT
Start: 2024-02-28 | End: 2024-03-06

## 2024-02-28 RX ORDER — WARFARIN SODIUM 2.5 MG/1
2.5 TABLET ORAL
Status: COMPLETED | OUTPATIENT
Start: 2024-02-28 | End: 2024-02-28

## 2024-02-28 RX ORDER — IPRATROPIUM BROMIDE AND ALBUTEROL SULFATE 2.5; .5 MG/3ML; MG/3ML
3 SOLUTION RESPIRATORY (INHALATION)
Status: DISCONTINUED | OUTPATIENT
Start: 2024-02-28 | End: 2024-02-29 | Stop reason: HOSPADM

## 2024-02-28 RX ORDER — FUROSEMIDE 20 MG
20 TABLET ORAL DAILY
Status: DISCONTINUED | OUTPATIENT
Start: 2024-02-28 | End: 2024-02-29 | Stop reason: HOSPADM

## 2024-02-28 RX ADMIN — IPRATROPIUM BROMIDE AND ALBUTEROL SULFATE 3 ML: .5; 3 SOLUTION RESPIRATORY (INHALATION) at 15:38

## 2024-02-28 RX ADMIN — MONTELUKAST 10 MG: 10 TABLET, FILM COATED ORAL at 21:49

## 2024-02-28 RX ADMIN — FUROSEMIDE 20 MG: 20 TABLET ORAL at 08:42

## 2024-02-28 RX ADMIN — CEFTRIAXONE 2 G: 2 INJECTION, POWDER, FOR SOLUTION INTRAMUSCULAR; INTRAVENOUS at 08:26

## 2024-02-28 RX ADMIN — PANTOPRAZOLE SODIUM 20 MG: 20 TABLET, DELAYED RELEASE ORAL at 17:10

## 2024-02-28 RX ADMIN — GABAPENTIN 1200 MG: 600 TABLET, FILM COATED ORAL at 21:49

## 2024-02-28 RX ADMIN — GABAPENTIN 1200 MG: 600 TABLET, FILM COATED ORAL at 17:10

## 2024-02-28 RX ADMIN — IPRATROPIUM BROMIDE AND ALBUTEROL SULFATE 3 ML: .5; 3 SOLUTION RESPIRATORY (INHALATION) at 20:00

## 2024-02-28 RX ADMIN — SIMVASTATIN 10 MG: 10 TABLET, FILM COATED ORAL at 21:49

## 2024-02-28 RX ADMIN — IPRATROPIUM BROMIDE AND ALBUTEROL SULFATE 3 ML: .5; 3 SOLUTION RESPIRATORY (INHALATION) at 11:12

## 2024-02-28 RX ADMIN — WARFARIN SODIUM 2.5 MG: 2.5 TABLET ORAL at 18:46

## 2024-02-28 RX ADMIN — CETIRIZINE HYDROCHLORIDE 10 MG: 10 TABLET, FILM COATED ORAL at 08:29

## 2024-02-28 RX ADMIN — FLUCONAZOLE 100 MG: 100 TABLET ORAL at 08:35

## 2024-02-28 RX ADMIN — GABAPENTIN 1200 MG: 600 TABLET, FILM COATED ORAL at 08:29

## 2024-02-28 RX ADMIN — FLUTICASONE FUROATE AND VILANTEROL TRIFENATATE 1 PUFF: 100; 25 POWDER RESPIRATORY (INHALATION) at 11:11

## 2024-02-28 RX ADMIN — PREDNISONE 40 MG: 20 TABLET ORAL at 08:29

## 2024-02-28 RX ADMIN — AMITRIPTYLINE HYDROCHLORIDE 30 MG: 25 TABLET, FILM COATED ORAL at 08:29

## 2024-02-28 RX ADMIN — AZITHROMYCIN DIHYDRATE 250 MG: 250 TABLET ORAL at 08:29

## 2024-02-28 RX ADMIN — DILTIAZEM HYDROCHLORIDE 180 MG: 180 CAPSULE, COATED, EXTENDED RELEASE ORAL at 08:30

## 2024-02-28 ASSESSMENT — ACTIVITIES OF DAILY LIVING (ADL)
ADLS_ACUITY_SCORE: 24

## 2024-02-28 NOTE — PLAN OF CARE
"/68 (BP Location: Left arm)   Pulse 78   Temp 97.8  F (36.6  C) (Oral)   Resp 18   Ht 1.651 m (5' 5\")   Wt 90.4 kg (199 lb 6.4 oz)   SpO2 94%   BMI 33.18 kg/m      VSS. Pt HR irregular, LS: diminished in LLL, Crackles in RML and RLL. Pt encouraged to cough. Pt sating at 90% on RA while sleeping, at 94% at rest and in the upper 80's while ambulating or talking. Skin intact.      Goal Outcome Evaluation:      Plan of Care Reviewed With: patient    Overall Patient Progress: improving    Outcome Evaluation: reviewed POC with patient including LS, oxygen sat monitoring and promotion of rest sleep.      Problem: Adult Inpatient Plan of Care  Goal: Plan of Care Review  Description: The Plan of Care Review/Shift note should be completed every shift.  The Outcome Evaluation is a brief statement about your assessment that the patient is improving, declining, or no change.  This information will be displayed automatically on your shift  note.  Outcome: Progressing  Flowsheets (Taken 2/28/2024 0336)  Outcome Evaluation: reviewed POC with patient including LS, oxygen sat monitoring and promotion of rest sleep.  Plan of Care Reviewed With: patient  Overall Patient Progress: improving  Goal: Absence of Hospital-Acquired Illness or Injury  Intervention: Identify and Manage Fall Risk  Recent Flowsheet Documentation  Taken 2/28/2024 0300 by Lindsay Llamas, RN  Safety Promotion/Fall Prevention: safety round/check completed  Taken 2/28/2024 0100 by Lindsay Llamas, RN  Safety Promotion/Fall Prevention:   safety round/check completed   room organization consistent   clutter free environment maintained  Taken 2/28/2024 0016 by Lindsay Llamas, RN  Safety Promotion/Fall Prevention: safety round/check completed  Intervention: Prevent Skin Injury  Recent Flowsheet Documentation  Taken 2/28/2024 0100 by Lindsay Llamas, RN  Body Position: position changed independently  Intervention: Prevent and Manage VTE (Venous " Thromboembolism) Risk  Recent Flowsheet Documentation  Taken 2/28/2024 0100 by Lindsay Llamas RN  VTE Prevention/Management: SCDs (sequential compression devices) off     Problem: Dysrhythmia  Goal: Normalized Cardiac Rhythm  Intervention: Monitor and Manage Cardiac Rhythm Effect  Recent Flowsheet Documentation  Taken 2/28/2024 0100 by Lindsay Llamas, RN  VTE Prevention/Management: SCDs (sequential compression devices) off

## 2024-02-28 NOTE — PLAN OF CARE
Goal Outcome Evaluation:      Plan of Care Reviewed With: patient    Overall Patient Progress: improvingOverall Patient Progress: improving    Assumed care [id number]    Pt. A/Ox4. Up independent in the room. VSS, on 2L NC , denies pain, sob. LS dim/clear. Regular diet with good appetite. Continues with poc.

## 2024-02-28 NOTE — DISCHARGE SUMMARY
"[facility]  Hospitalist Discharge Summary      Date of Admission:  2/25/2024  Date of Discharge:  2/28/2024  Discharging Provider: Yonathan Bernardo MD, MD  Discharge Service: Hospitalist Service    Discharge Diagnoses   Acute hypoxic respiratory failure   Sepsis with presenting fever, tachycardia, leukocytosis with hypoxia secondary to underlying suspected bacterial right-sided community-acquired pneumonia  Asthma exacerbation with history of mild intermittent bronchial asthma  Atrial fibrillation currently CVR presented with RVR  Dyslipidemia  History of GERD    Clinically Significant Risk Factors     # Obesity: Estimated body mass index is 33.18 kg/m  as calculated from the following:    Height as of this encounter: 1.651 m (5' 5\").    Weight as of this encounter: 90.4 kg (199 lb 6.4 oz).       Follow-ups Needed After Discharge   Follow-up Appointments     Follow-up and recommended labs and tests       Follow up with primary care provider, Natalya Matthews, within 7 days to   evaluate medication change, to evaluate treatment change, and for hospital   follow- up.  The following labs/tests are recommended: Repeat INR and   adjust warfarin dosing accordingly.            Unresulted Labs Ordered in the Past 30 Days of this Admission       Date and Time Order Name Status Description    2/25/2024  8:59 AM Blood Culture Peripheral Blood Preliminary     2/25/2024  8:59 AM Blood Culture Peripheral Blood Preliminary         These results will be followed up by PCP    Discharge Disposition   Discharged to home  Condition at discharge: Stable    Hospital Course      Kaycee bryant is a pleasant 83-year-old lady who independently lives with her spouse and a background history of prior pneumonia, mild asthma on inhalers who presented here with shortness of breath, coughing spells eventually found with community-acquired pneumonia with high suspicion to be bacterial in etiology.  She was treated with " breathing treatments, oxygen support that was subsequently tapered and discontinued, corticosteroids and she demonstrated significant improvement and getting back to her baseline levels.  No significant events overnight.  Tolerating oral diet with no nausea or vomiting or diarrhea.  Plans for hospital discharge with continuation of tapering dose of corticosteroids and finishing course of antibiotics.  She has longstanding history of atrial fibrillation with reassuring echocardiogram done here.  Current weight reading acceptable ranges continued on her diltiazem and her chronic anticoagulation with warfarin       I will refer you to excerpts of prior progress notes as listed below for other details of her hospitalization stay    Kaycee Fuchs is a 83 year old female patient with past medical history of asthma, atrial fibrillation, hyperlipidemia, GERD, history of recurrent respiratory tract infections, came to emergency room for evaluation for cough, shortness of breath and wheezing.  She states that she has been having nasal congestion for the past few days.  This morning she woke up with severe cough associated with fever and chills.  She also states that she had shortness of breath and wheezing.  He denies chest pain.  She also denies leg swelling.  Because of worsening of her symptoms she came to emergency room for evaluation.  On arrival to emergency room, her vital signs were checked and showed temperature 102.2, pulse 161, blood pressure 131/79, oxygen saturation 93% on 10 L.  Lab workup showed normal BMP, lactic acid 1.3.  Venous gas within normal range.  WBC 11.3, hemoglobin 13.4.  Chest x-ray showed increased opacity in the perihilar right upper lobe, small right pleural effusion.  He has cardiomegaly with mild pulmonary vascular congestion.  EKG showed atrial fibrillation with rapid ventricular response.   In emergency room, she was given nebs, IV ceftriaxone and azithromycin.  She was given prednisone  40 mg p.o. cardiology was consulted from emergency room for A-fib with RVR as her blood pressure is running on the low side. Cardiology recommended digoxin 250 mcg IV to be repeated in an hour.  Patient received 2 doses of digoxin 250 mcg IV in the emergency room.  She was admitted to the hospital for further management.    Acute hypoxic respiratory failure   Sepsis with presenting fever, tachycardia, leukocytosis with hypoxia secondary to underlying suspected bacterial right-sided community-acquired pneumonia  Asthma exacerbation with history of mild intermittent bronchial asthma    -Patient states that she had nasal congestion for few days.  She developed a sudden shortness of breath, cough, fever and chills this morning.  She states that she had wheezes.  -In the ER, she was noted to be febrile and tachycardic.  She was also hypoxic.  She was put on oxygen supplement.  Lab workup showed mild leukocytosis at 11.3.  Lactic acid 1.3.    -Chest x-ray revealed evidence of right upper lobe perihilar opacity and small right pleural effusion.  -Continue to antibiotics currently on ceftriaxone and Zithromax.  -Echocardiogram showed reassuring findings with preserved EF and no significant valvular abnormalities  Will put her on Xopenex nebs given her A-fib with RVR  -She was given prednisone 40 mg p.o. in the ER.  Will continue prednisone 40 mg daily.  Likely can discharge on lower dose of prednisone  -Resume Singulair Advair  -Also resume her candidiasis prophylaxis of Diflucan that she takes ever since she has been placed on Advair    Atrial fibrillation with rapid ventricular response  -likely triggered by pneumonia and asthma exacerbation.  Anticoagulated with warfarin.  She is on diltiazem  mg daily PTA.  EKG showed A-fib with RVR.  Cardiology was consulted from ER and recommended digoxin IV given her low blood pressure.  She received digoxin 250 mcg IV x 2 doses.  -Will continue to monitor on telemetry  -Will  resume diltiazem  mg daily with hold parameter  -No issues of tachyarrhythmias overnight.  Remained on CVR  -Can discontinue telemonitoring  -Therapeutic INR    Hyperlipidemia  Will resume simvastatin    GERD  Remain on PPI-      Consultations This Hospital Stay   PHARMACY TO DOSE WARFARIN    Code Status   Full Code    Time Spent on this Encounter   I, Yonathan Bernardo MD, MD, personally saw the patient today and spent greater than 30 minutes discharging this patient.       Yonathan Bernardo MD, MD  [address] [facility]  [address] [place] [address]  Phone: [phone]  Fax: [phone]  ______________________________________________________________________    Physical Exam   Vital Signs: Temp: 97.8  F (36.6  C) Temp src: Oral BP: 134/77 Pulse: 88   Resp: 18 SpO2: 91 % O2 Device: Nasal cannula Oxygen Delivery: 2 LPM  Weight: 199 lbs 6.4 oz  HEENT; Atraumatic, normocephalic, pinkish conjuctiva, pupils bilateral reactive   Skin: warm and moist, no rashes  Lungs: equal chest expansion, clear to auscultation, no wheezes, no stridor, no crackles,   Heart: normal rate, normal rhythm, no rubs or gallops.   Abdomen: normal bowel sounds, no tenderness, no peritoneal signs, no guarding  Extremities: no deformities, no edema   Neuro; follow commands, alert and oriented x3, spontaneous speech, coherent, moves all extremities spontaneously  Psych; no hallucination, euthymic mood, not agitated         Primary Care Physician   Natalya Matthews    Discharge Orders      Reason for your hospital stay    Kaycee bryant is a pleasant 83-year-old lady who independently lives with her spouse and a background history of prior pneumonia, mild asthma on inhalers who presented here with shortness of breath, coughing spells eventually found with community-acquired pneumonia with high suspicion to be bacterial in etiology.  She was treated with breathing treatments, oxygen support that was  subsequently tapered and discontinued, corticosteroids and she demonstrated significant improvement and getting back to her baseline levels.  No significant events overnight.  Tolerating oral diet with no nausea or vomiting or diarrhea.  Plans for hospital discharge with continuation of tapering dose of corticosteroids and finishing course of antibiotics.  She has longstanding history of atrial fibrillation with reassuring echocardiogram done here.  Current weight reading acceptable ranges continued on her diltiazem and her chronic anticoagulation with warfarin     Follow-up and recommended labs and tests     Follow up with primary care provider, Natalya Matthews, within 7 days to evaluate medication change, to evaluate treatment change, and for hospital follow- up.  The following labs/tests are recommended: Repeat INR and adjust warfarin dosing accordingly.     Activity    Your activity upon discharge: activity as tolerated     Full Code     Diet    Follow this diet upon discharge: Orders Placed This Encounter      Combination Diet Regular Diet Adult       Significant Results and Procedures   Most Recent 3 CBC's:  Recent Labs   Lab Test 02/27/24  1035 02/26/24  0610 02/25/24  0936   WBC 15.0* 15.3* 11.3*   HGB 11.4* 11.3* 13.4    99 97    229 239     Most Recent 3 BMP's:  Recent Labs   Lab Test 02/28/24  0643 02/27/24  1035 02/26/24  0610 02/25/24  0936   NA  --  140 140 139   POTASSIUM 3.8 3.6 3.9 4.4   CHLORIDE  --  105 107 104   CO2  --  27 26 25   BUN  --  15.6 16.0 12.5   CR  --  0.86 0.85 0.91   ANIONGAP  --  8 7 10   MELISSA  --  9.0 9.0 8.9   GLC  --  107* 88 142*     Most Recent 2 LFT's:  Recent Labs   Lab Test 02/25/24  0936 10/05/23  2329   AST 37 17   ALT 13 9   ALKPHOS 60 66   BILITOTAL 0.5 0.5     Most Recent 3 INR's:  Recent Labs   Lab Test 02/28/24  0643 02/27/24  1035 02/26/24  0610   INR 2.18* 2.65* 3.49*     Most Recent 3 Troponin's:No lab results found.  Most Recent 3 BNP's:No lab  results found.  Most Recent TSH and T4:No lab results found.  Most Recent Hemoglobin A1c:No lab results found.  Most Recent 6 glucoses:  Recent Labs   Lab Test 24  1035 24  0610 24  0936 10/06/23  1231 10/06/23  0828 10/05/23  2329   * 88 142* 115* 92 116*     Most Recent Urinalysis:No lab results found.,   Results for orders placed or performed during the hospital encounter of 24   XR Chest Port 1 View    Narrative    EXAM: XR CHEST PORTABLE 1 VIEW  LOCATION: [facility]  DATE: 2024    INDICATION: Dyspnea.  COMPARISON: 10/06/2023.      Impression    IMPRESSION: Increased opacity in the perihilar right upper lobe. Small right pleural effusion. Stable atelectasis or scarring at the lung bases. Cardiomegaly with mild pulmonary vascular congestion.     Echocardiogram Complete     Value    LVEF  60-65%    Narrative    [id number]  [id number]  [id number]  [id number]^ERWIN^AL^ERIN     [facility]  Echocardiography Laboratory  [address] [place] [address]     Name: FRANCISCO J ARREAGA  MRN: [id number]  : 1940  Study Date: 2024 10:57 AM  Age: 83 yrs  Gender: Female  Patient Location: [facility]  Reason For Study: Atrial Fibrillation  Ordering Physician: JUVENTINO HOOD  Referring Physician: Natalya Matthews NP  Performed By: Ahmet Hu RDCS     BSA: 2.0 m2  Height: 65 in  Weight: 199 lb  HR: 99  BP: 110/50 mmHg  ______________________________________________________________________________  Procedure  Complete Portable Echo Adult.  ______________________________________________________________________________  Interpretation Summary     The left ventricle is normal in size.  The visual ejection fraction is 60-65%.  No regional wall motion abnormalities noted.  No significant valvular heart disease.  The rhythm was atrial fibrillation.  ______________________________________________________________________________  Left  Ventricle  The left ventricle is normal in size. There is normal left ventricular wall  thickness. Diastolic Doppler findings (E/E' ratio and/or other parameters)  suggest left ventricular filling pressures are indeterminate. The visual  ejection fraction is 60-65%. No regional wall motion abnormalities noted.     Right Ventricle  The right ventricle is normal in size and function.     Atria  Normal left atrial size. Right atrial size is normal. There is no color  Doppler evidence of an atrial shunt.     Mitral Valve  The mitral valve leaflets are mildly thickened. There is trace mitral  regurgitation.     Tricuspid Valve  There is trace tricuspid regurgitation. IVC diameter and respiratory changes  fall into an intermediate range suggesting an RA pressure of 8 mmHg.     Aortic Valve  There is trivial trileaflet aortic sclerosis. No aortic regurgitation is  present.     Pulmonic Valve  There is trace pulmonic valvular regurgitation.     Vessels  Normal size aorta. The aortic root is normal size.     Pericardium  There is no pericardial effusion.     Rhythm  The rhythm was atrial fibrillation.  ______________________________________________________________________________  MMode/2D Measurements & Calculations  IVSd: 0.91 cm     LVIDd: 4.2 cm  LVIDs: 2.9 cm  LVPWd: 0.97 cm  IVC diam: 2.3 cm  FS: 31.3 %  LV mass(C)d: 127.2 grams  LV mass(C)dI: 64.4 grams/m2  Ao root diam: 3.5 cm  LA dimension: 4.1 cm  asc Aorta Diam: 3.5 cm  LA/Ao: 1.1  Ao root diam index Ht(cm/m): 2.1  Ao root diam index BSA (cm/m2): 1.8  Asc Ao diam index BSA (cm/m2): 1.8  Asc Ao diam index Ht(cm/m): 2.1  LA Volume (BP): 55.3 ml     LA Volume Index (BP): 28.1 ml/m2  RWT: 0.46  TAPSE: 1.7 cm     Doppler Measurements & Calculations  MV E max teri: 155.0 cm/sec  MV dec slope: 761.7 cm/sec2  MV dec time: 0.20 sec  Ao V2 max: 190.4 cm/sec  Ao max P.6 mmHg  TR max teri: 283.9 cm/sec  TR max P.9 mmHg  E/E' av.1  Lateral E/e': 12.3  Medial E/e':  14.0  RV S Sarthak: 17.3 cm/sec     ______________________________________________________________________________  Report approved by: Christi Jerry 02/26/2024 12:12 PM             Discharge Medications   Current Discharge Medication List        START taking these medications    Details   amoxicillin-clavulanate (AUGMENTIN) 875-125 MG tablet Take 1 tablet by mouth 2 times daily for 4 days  Qty: 8 tablet, Refills: 0    Associated Diagnoses: Pneumonia of right lung due to infectious organism, unspecified part of lung      predniSONE (DELTASONE) 20 MG tablet Take 2 tablets (40 mg) by mouth daily for 1 day, THEN 1 tablet (20 mg) daily for 3 days, THEN 0.5 tablets (10 mg) daily for 3 days.  Qty: 7 tablet, Refills: 0    Associated Diagnoses: Pneumonia of right lung due to infectious organism, unspecified part of lung; Asthma with acute exacerbation, unspecified asthma severity, unspecified whether persistent           CONTINUE these medications which have NOT CHANGED    Details   amitriptyline (ELAVIL) 10 MG tablet Take 30 mg by mouth every morning      diltiazem ER (DILT-XR) 180 MG 24 hr capsule Take 180 mg by mouth daily      FLONASE 50 MCG/ACT NA SUSP Spray 1 spray into both nostrils 2 times daily    Associated Diagnoses: Tension headaches      fluconazole (DIFLUCAN) 100 MG tablet Take 100 mg by mouth three times a week Sun, Wed, Fri      fluticasone-salmeterol (ADVAIR) 250-50 MCG/ACT inhaler Inhale 1 puff into the lungs every 12 hours      gabapentin (NEURONTIN) 600 MG tablet Take 1,200 mg by mouth 3 times daily      montelukast (SINGULAIR) 10 MG tablet Take 10 mg by mouth at bedtime      omeprazole (PRILOSEC OTC) 20 MG EC tablet Take 20 mg by mouth daily      simvastatin (ZOCOR) 10 MG tablet Take 10 mg by mouth at bedtime      warfarin ANTICOAGULANT (COUMADIN) 5 MG tablet Take 2.5-5 mg by mouth daily 5mg MWF, 2.5mg rest of the week      ZYRTEC ALLERGY PO Take 10 mg by mouth daily    Associated Diagnoses: Tension  "headaches           Allergies   Allergies   Allergen Reactions    Claritin [Loratadine]      Arthritic feeling in hands and feet    Codeine      \"Wide-awake\"    Morphine And Related     Zantac      "

## 2024-02-28 NOTE — PLAN OF CARE
"Goal Outcome Evaluation:      Plan of Care Reviewed With: patient    Overall Patient Progress: no change     Temp: 98.5  F (36.9  C) Temp src: Oral BP: 101/52 Pulse: 92   Resp: 16 SpO2: 91 % O2 Device: None (Room air)     Pt A/O x4, VSS, afebrile. Denies pain, c/p, dizziness, n/v. C/o frequent dry coughs, PRN Robitussin x1 given w/ some improvement. SOB w/ activities. PIV patent and SL. On K+ protocols, AM recheck. Tele discontinued this AM. Up independently in room. Plan for possible discharge tomorrow.    Problem: Adult Inpatient Plan of Care  Goal: Plan of Care Review  Description: The Plan of Care Review/Shift note should be completed every shift.  The Outcome Evaluation is a brief statement about your assessment that the patient is improving, declining, or no change.  This information will be displayed automatically on your shift  note.  Outcome: Progressing  Flowsheets (Taken 2/27/2024 8452)  Plan of Care Reviewed With: patient  Overall Patient Progress: no change  Goal: Patient-Specific Goal (Individualized)  Description: You can add care plan individualizations to a care plan. Examples of Individualization might be:  \"Parent requests to be called daily at 9am for status\", \"I have a hard time hearing out of my right ear\", or \"Do not touch me to wake me up as it startles  me\".  Outcome: Progressing  Goal: Absence of Hospital-Acquired Illness or Injury  Outcome: Progressing  Intervention: Identify and Manage Fall Risk  Recent Flowsheet Documentation  Taken 2/27/2024 1705 by Sherlyn Avelar RN  Safety Promotion/Fall Prevention:   clutter free environment maintained   nonskid shoes/slippers when out of bed   patient and family education   room organization consistent   safety round/check completed  Intervention: Prevent Skin Injury  Recent Flowsheet Documentation  Taken 2/27/2024 1705 by Sherlyn Avelar RN  Body Position: position changed independently  Taken 2/27/2024 1543 by Sherlyn Avelar RN  Body Position:   position " changed independently   supine, head elevated  Intervention: Prevent and Manage VTE (Venous Thromboembolism) Risk  Recent Flowsheet Documentation  Taken 2/27/2024 1705 by Sherlyn Avelar RN  VTE Prevention/Management: SCDs (sequential compression devices) off  Intervention: Prevent Infection  Recent Flowsheet Documentation  Taken 2/27/2024 1705 by Sherlyn Avelar RN  Infection Prevention: hand hygiene promoted  Goal: Optimal Comfort and Wellbeing  Outcome: Progressing  Goal: Readiness for Transition of Care  Outcome: Progressing     Problem: Dysrhythmia  Goal: Normalized Cardiac Rhythm  Outcome: Progressing  Intervention: Monitor and Manage Cardiac Rhythm Effect  Recent Flowsheet Documentation  Taken 2/27/2024 1705 by Sherlyn Avelar RN  VTE Prevention/Management: SCDs (sequential compression devices) off

## 2024-02-28 NOTE — PROGRESS NOTES
[facility]    Medicine Progress Note - Hospitalist Service    Date of Admission:  2/25/2024    Assessment & Plan   Kaycee Fuchs is a 83 year old female patient with past medical history of asthma, atrial fibrillation, hyperlipidemia, GERD, history of recurrent respiratory tract infections, came to emergency room for evaluation for cough, shortness of breath and wheezing.  She states that she has been having nasal congestion for the past few days.  This morning she woke up with severe cough associated with fever and chills.  She also states that she had shortness of breath and wheezing.  He denies chest pain.  She also denies leg swelling.  Because of worsening of her symptoms she came to emergency room for evaluation.  On arrival to emergency room, her vital signs were checked and showed temperature 102.2, pulse 161, blood pressure 131/79, oxygen saturation 93% on 10 L.  Lab workup showed normal BMP, lactic acid 1.3.  Venous gas within normal range.  WBC 11.3, hemoglobin 13.4.  Chest x-ray showed increased opacity in the perihilar right upper lobe, small right pleural effusion.  He has cardiomegaly with mild pulmonary vascular congestion.  EKG showed atrial fibrillation with rapid ventricular response.   In emergency room, she was given nebs, IV ceftriaxone and azithromycin.  She was given prednisone 40 mg p.o. cardiology was consulted from emergency room for A-fib with RVR as her blood pressure is running on the low side. Cardiology recommended digoxin 250 mcg IV to be repeated in an hour.  Patient received 2 doses of digoxin 250 mcg IV in the emergency room.  She was admitted to the hospital for further management.    Acute hypoxic respiratory failure   Sepsis with presenting fever, tachycardia, leukocytosis with hypoxia secondary to underlying suspected bacterial right-sided community-acquired pneumonia  Asthma exacerbation with history of mild intermittent bronchial asthma    -Cancel  earlier plan discharge  -She feels a little bit worse today and requiring oxygen overnight  -Somewhat frustrated for not getting her breathing treatments overnight  -Orders change with scheduled DuoNebs and as needed albuterol  -Currently with CVR with her known atrial fibrillation  -Hopeful to go home without home oxygen support  -Continue to provide oxygen support by nasal cannula and titrate and discontinue as able  -Will also initiate a dose of Lasix    -Patient states that she had nasal congestion for few days.  She developed a sudden shortness of breath, cough, fever and chills this morning.  She states that she had wheezes.  -In the ER, she was noted to be febrile and tachycardic.  She was also hypoxic.  She was put on oxygen supplement.  Lab workup showed mild leukocytosis at 11.3.  Lactic acid 1.3.    -Chest x-ray revealed evidence of right upper lobe perihilar opacity and small right pleural effusion.  -Continue to antibiotics currently on ceftriaxone and Zithromax.  -Echocardiogram showed reassuring findings with preserved EF and no significant valvular abnormalities  Will put her on Xopenex nebs given her A-fib with RVR  -She was given prednisone 40 mg p.o. in the ER.  Will continue prednisone 40 mg daily.  Likely can discharge on lower dose of prednisone  -Resume Singulair, Advair  -Also resume her candidiasis prophylaxis of Diflucan that she takes ever since she has been placed on Advair    Atrial fibrillation with rapid ventricular response  -likely triggered by pneumonia and asthma exacerbation.  Anticoagulated with warfarin.  She is on diltiazem  mg daily PTA.  EKG showed A-fib with RVR.  Cardiology was consulted from ER and recommended digoxin IV given her low blood pressure.  She received digoxin 250 mcg IV x 2 doses.  -Will continue to monitor on telemetry  -Will resume diltiazem  mg daily with hold parameter  -No issues of tachyarrhythmias overnight.  Remained on CVR  -Can  "discontinue telemonitoring  -Therapeutic INR    Hyperlipidemia  Will resume simvastatin    GERD  Remain on PPI-            Diet: Combination Diet Regular Diet Adult  Diet    DVT Prophylaxis: Warfarin  Shearer Catheter: Not present  Lines: None     Cardiac Monitoring: None  Code Status: Full Code      Clinically Significant Risk Factors                         # Obesity: Estimated body mass index is 33.18 kg/m  as calculated from the following:    Height as of this encounter: 1.651 m (5' 5\").    Weight as of this encounter: 90.4 kg (199 lb 6.4 oz)., PRESENT ON ADMISSION     # Asthma: noted on problem list        Disposition Plan     Expected Discharge Date:               Likely she will be a good candidate for hospital discharge in the next 24 hours      Yonathan Bernardo MD, MD  Hospitalist Service  [facility]  Securely message with [employer] (more info)  Text page via [employer] Paging/Directory   ______________________________________________________________________    Interval History   Continuing medicine service care today.  Seen and examined.  Chart reviewed.  I met this very pleasant lady while she is laying comfortably in bed.  Kaycee remained to be in good spirits this she thinks she is improving.  She was taken off from oxygen support earlier and still maintaining good oxygen level.  No significant mental status changes reported.  Tolerating oral diet.  She is endorsing no nausea or vomiting.  No reported diarrhea.   Ambulating more and denies any symptomatology of dizziness or lightheadedness.   Still having some coughing spells      Physical Exam   Vital Signs: Temp: 97.8  F (36.6  C) Temp src: Oral BP: 134/77 Pulse: 88   Resp: 18 SpO2: 91 % O2 Device: Nasal cannula Oxygen Delivery: 2 LPM  Weight: 199 lbs 6.4 oz    HEENT; Atraumatic, normocephalic, pinkish conjuctiva, pupils bilateral reactive   Skin: warm and moist, no rashes  Lymphatics: no cervical or axillary lymphandenopathy  Lungs: " Fair air entry, scant wheezes, right fine inspiratory crackles mid lung    Heart: normal rate, normal rhythm, no rubs or gallops.   Abdomen: normal bowel sounds, no tenderness, no peritoneal signs, no guarding  Extremities: no deformities, chronic hyperpigmented skin lower extremities, bilateral nonpitting lower extremity edema   Neuro; follow commands, alert and oriented x3, spontaneous speech, coherent, moves all extremities spontaneously  Psych; no hallucination, euthymic mood, not agitated      Medical Decision Making       45 MINUTES SPENT BY ME on the date of service doing chart review, history, exam, documentation & further activities per the note.  MANAGEMENT DISCUSSED with the following over the past 24 hours: YEs   NOTE(S)/MEDICAL RECORDS REVIEWED over the past 24 hours: YES       Data     I have personally reviewed the following data over the past 24 hrs:    15.0 (H)  \   11.4 (L)   / 233     140 105 15.6 /  107 (H)   3.8 27 0.86 \     INR:  2.18 (H) PTT:  N/A   D-dimer:  N/A Fibrinogen:  N/A       Imaging results reviewed over the past 24 hrs:   No results found for this or any previous visit (from the past 24 hour(s)).

## 2024-02-29 VITALS
OXYGEN SATURATION: 94 % | BODY MASS INDEX: 33.22 KG/M2 | HEIGHT: 65 IN | DIASTOLIC BLOOD PRESSURE: 80 MMHG | WEIGHT: 199.4 LBS | RESPIRATION RATE: 17 BRPM | SYSTOLIC BLOOD PRESSURE: 136 MMHG | TEMPERATURE: 97.4 F | HEART RATE: 84 BPM

## 2024-02-29 LAB
INR PPP: 1.89 (ref 0.85–1.15)
POTASSIUM SERPL-SCNC: 4 MMOL/L (ref 3.4–5.3)

## 2024-02-29 PROCEDURE — 250N000011 HC RX IP 250 OP 636: Performed by: INTERNAL MEDICINE

## 2024-02-29 PROCEDURE — 99239 HOSP IP/OBS DSCHRG MGMT >30: CPT | Performed by: INTERNAL MEDICINE

## 2024-02-29 PROCEDURE — 36415 COLL VENOUS BLD VENIPUNCTURE: CPT | Performed by: INTERNAL MEDICINE

## 2024-02-29 PROCEDURE — 250N000013 HC RX MED GY IP 250 OP 250 PS 637: Performed by: INTERNAL MEDICINE

## 2024-02-29 PROCEDURE — 85610 PROTHROMBIN TIME: CPT | Performed by: INTERNAL MEDICINE

## 2024-02-29 PROCEDURE — 999N000157 HC STATISTIC RCP TIME EA 10 MIN

## 2024-02-29 PROCEDURE — 250N000012 HC RX MED GY IP 250 OP 636 PS 637: Performed by: INTERNAL MEDICINE

## 2024-02-29 PROCEDURE — 250N000009 HC RX 250: Performed by: INTERNAL MEDICINE

## 2024-02-29 PROCEDURE — 94640 AIRWAY INHALATION TREATMENT: CPT | Mod: 76

## 2024-02-29 PROCEDURE — 84132 ASSAY OF SERUM POTASSIUM: CPT | Performed by: INTERNAL MEDICINE

## 2024-02-29 RX ORDER — WARFARIN SODIUM 3 MG/1
3 TABLET ORAL DAILY
Qty: 10 TABLET | Refills: 0 | Status: SHIPPED | OUTPATIENT
Start: 2024-02-29

## 2024-02-29 RX ORDER — WARFARIN SODIUM 5 MG/1
5 TABLET ORAL
Status: DISCONTINUED | OUTPATIENT
Start: 2024-02-29 | End: 2024-02-29 | Stop reason: HOSPADM

## 2024-02-29 RX ADMIN — FLUTICASONE FUROATE AND VILANTEROL TRIFENATATE 1 PUFF: 100; 25 POWDER RESPIRATORY (INHALATION) at 07:53

## 2024-02-29 RX ADMIN — IPRATROPIUM BROMIDE AND ALBUTEROL SULFATE 3 ML: .5; 3 SOLUTION RESPIRATORY (INHALATION) at 07:53

## 2024-02-29 RX ADMIN — FUROSEMIDE 20 MG: 20 TABLET ORAL at 08:23

## 2024-02-29 RX ADMIN — CEFTRIAXONE 2 G: 2 INJECTION, POWDER, FOR SOLUTION INTRAMUSCULAR; INTRAVENOUS at 08:22

## 2024-02-29 RX ADMIN — PREDNISONE 40 MG: 20 TABLET ORAL at 08:22

## 2024-02-29 RX ADMIN — AZITHROMYCIN DIHYDRATE 250 MG: 250 TABLET ORAL at 08:23

## 2024-02-29 RX ADMIN — CETIRIZINE HYDROCHLORIDE 10 MG: 10 TABLET, FILM COATED ORAL at 08:23

## 2024-02-29 RX ADMIN — DILTIAZEM HYDROCHLORIDE 180 MG: 180 CAPSULE, COATED, EXTENDED RELEASE ORAL at 08:23

## 2024-02-29 RX ADMIN — GABAPENTIN 1200 MG: 600 TABLET, FILM COATED ORAL at 08:23

## 2024-02-29 RX ADMIN — AMITRIPTYLINE HYDROCHLORIDE 30 MG: 25 TABLET, FILM COATED ORAL at 08:23

## 2024-02-29 ASSESSMENT — ACTIVITIES OF DAILY LIVING (ADL)
ADLS_ACUITY_SCORE: 24

## 2024-02-29 NOTE — PLAN OF CARE
Cared for [id number]    Activity: Independent, bed alarm off. Uses call light appropriately and within reach.   Neuro:  A&Ox4, intact. Denies n/v/ and headaches.  Cardiac: hx of afib. Denies chest pain or SOB. On po diltiazem  Resp: 0-0.5L NC with O2 stats ranging from mid high 70's-90's. Attempted to wean off pt x2. Remains on 0.5L NC.  GI/: Per previous shift, no BM for 2 days. Prune juice was given on previous shift. No BM on shift.   Diet: Regular  LDAs:  R Hand PIV, SL. L PIV, SL.   Pain: denies  PRNs: none  Labs: K replacement, recheck for AM.    Discharge Plan: weaning off O2, possible discharge today 2/29    Plan: Continue with plan of care  For vital signs and complete assessments, please see documentation under flowsheets.    Nae Thompson, RN      Goal Outcome Evaluation: unchanged

## 2024-02-29 NOTE — DISCHARGE SUMMARY
"[facility]  Hospitalist Discharge Summary      Date of Admission:  2/25/2024  Date of Discharge:  2/29/2024  Discharging Provider: Yonathan Bernardo MD, MD  Discharge Service: Hospitalist Service    Discharge Diagnoses   Acute hypoxic respiratory failure   Sepsis with presenting fever, tachycardia, leukocytosis with hypoxia secondary to underlying suspected bacterial right-sided community-acquired pneumonia  Asthma exacerbation with history of mild intermittent bronchial asthma  Atrial fibrillation currently CVR presented with RVR  Dyslipidemia  History of GERD    Clinically Significant Risk Factors     # Obesity: Estimated body mass index is 33.18 kg/m  as calculated from the following:    Height as of this encounter: 1.651 m (5' 5\").    Weight as of this encounter: 90.4 kg (199 lb 6.4 oz).       Follow-ups Needed After Discharge   Follow-up Appointments     Follow-up and recommended labs and tests       Follow up with primary care provider, Natalya Matthews, within 7 days to   evaluate medication change, to evaluate treatment change, and for hospital   follow- up.  The following labs/tests are recommended: Repeat INR and   adjust warfarin dosing accordingly.            Unresulted Labs Ordered in the Past 30 Days of this Admission       Date and Time Order Name Status Description    2/29/2024 12:01 AM Potassium In process     2/25/2024  8:59 AM Blood Culture Peripheral Blood Preliminary     2/25/2024  8:59 AM Blood Culture Peripheral Blood Preliminary         These results will be followed up by PCP    Discharge Disposition   Discharged to home  Condition at discharge: Stable    Hospital Course     Continuing service care for this pleasant lady.  Her planned discharge last February 28 was deferred as she was still requiring oxygen support at that time and not feeling too well and having some shortness of breath.  Overnight she did well with no issues of any tachyarrhythmias.  Successfully " weaned off from oxygen.  Feeling significantly improved.  Slept very well overnight.  Tolerating oral diet.  She is requesting for hospital discharge as well.  And will proceed with that today.  No changes with her prescription medication which she will be finishing course of her antibiotics and tapering dose of steroids.         Kaycee bryant is a pleasant 83-year-old lady who independently lives with her spouse and a background history of prior pneumonia, mild asthma on inhalers who presented here with shortness of breath, coughing spells eventually found with community-acquired pneumonia with high suspicion to be bacterial in etiology.  She was treated with breathing treatments, oxygen support that was subsequently tapered and discontinued, corticosteroids and she demonstrated significant improvement and getting back to her baseline levels.  No significant events overnight.  Tolerating oral diet with no nausea or vomiting or diarrhea.  Plans for hospital discharge with continuation of tapering dose of corticosteroids and finishing course of antibiotics.  She has longstanding history of atrial fibrillation with reassuring echocardiogram done here.  Current weight reading acceptable ranges continued on her diltiazem and her chronic anticoagulation with warfarin       I will refer you to excerpts of prior progress notes as listed below for other details of her hospitalization stay    Kaycee Fuchs is a 83 year old female patient with past medical history of asthma, atrial fibrillation, hyperlipidemia, GERD, history of recurrent respiratory tract infections, came to emergency room for evaluation for cough, shortness of breath and wheezing.  She states that she has been having nasal congestion for the past few days.  This morning she woke up with severe cough associated with fever and chills.  She also states that she had shortness of breath and wheezing.  He denies chest pain.  She also denies leg swelling.  Because of  worsening of her symptoms she came to emergency room for evaluation.  On arrival to emergency room, her vital signs were checked and showed temperature 102.2, pulse 161, blood pressure 131/79, oxygen saturation 93% on 10 L.  Lab workup showed normal BMP, lactic acid 1.3.  Venous gas within normal range.  WBC 11.3, hemoglobin 13.4.  Chest x-ray showed increased opacity in the perihilar right upper lobe, small right pleural effusion.  He has cardiomegaly with mild pulmonary vascular congestion.  EKG showed atrial fibrillation with rapid ventricular response.   In emergency room, she was given nebs, IV ceftriaxone and azithromycin.  She was given prednisone 40 mg p.o. cardiology was consulted from emergency room for A-fib with RVR as her blood pressure is running on the low side. Cardiology recommended digoxin 250 mcg IV to be repeated in an hour.  Patient received 2 doses of digoxin 250 mcg IV in the emergency room.  She was admitted to the hospital for further management.    Acute hypoxic respiratory failure   Sepsis with presenting fever, tachycardia, leukocytosis with hypoxia secondary to underlying suspected bacterial right-sided community-acquired pneumonia  Asthma exacerbation with history of mild intermittent bronchial asthma    -Patient states that she had nasal congestion for few days.  She developed a sudden shortness of breath, cough, fever and chills this morning.  She states that she had wheezes.  -In the ER, she was noted to be febrile and tachycardic.  She was also hypoxic.  She was put on oxygen supplement.  Lab workup showed mild leukocytosis at 11.3.  Lactic acid 1.3.    -Chest x-ray revealed evidence of right upper lobe perihilar opacity and small right pleural effusion.  -Continue to antibiotics currently on ceftriaxone and Zithromax.  -Echocardiogram showed reassuring findings with preserved EF and no significant valvular abnormalities  Will put her on Xopenex nebs given her A-fib with RVR  -She  was given prednisone 40 mg p.o. in the ER.  Will continue prednisone 40 mg daily.  Likely can discharge on lower dose of prednisone  -Resume Singulair, Advair  -Also resume her candidiasis prophylaxis of Diflucan that she takes ever since she has been placed on Advair    Atrial fibrillation with rapid ventricular response  -likely triggered by pneumonia and asthma exacerbation.  Anticoagulated with warfarin.  She is on diltiazem  mg daily PTA.  EKG showed A-fib with RVR.  Cardiology was consulted from ER and recommended digoxin IV given her low blood pressure.  She received digoxin 250 mcg IV x 2 doses.  -Will continue to monitor on telemetry  -Will resume diltiazem  mg daily with hold parameter  -No issues of tachyarrhythmias overnight.  Remained on CVR  -Can discontinue telemonitoring  -Therapeutic INR    Hyperlipidemia  Will resume simvastatin    GERD  Remain on PPI-      Consultations This Hospital Stay   PHARMACY TO DOSE WARFARIN    Code Status   Full Code    Time Spent on this Encounter   I, Yonathan Bernardo MD, MD, personally saw the patient today and spent greater than 30 minutes discharging this patient.       Yonathan Bernardo MD, MD  [place] [address] [facility]  [address] [place] [address]  Phone: [phone]  Fax: [phone]  ______________________________________________________________________    Physical Exam   Vital Signs: Temp: 97.4  F (36.3  C) Temp src: Oral BP: 136/80 Pulse: 84   Resp: 17 SpO2: 94 % O2 Device: None (Room air) Oxygen Delivery: 1/2 LPM  Weight: 199 lbs 6.4 oz  HEENT; Atraumatic, normocephalic, pinkish conjuctiva, pupils bilateral reactive   Skin: warm and moist, no rashes  Lungs: equal chest expansion, clear to auscultation, no wheezes, no stridor, no crackles,   Heart: normal rate, normal rhythm, no rubs or gallops.   Abdomen: normal bowel sounds, no tenderness, no peritoneal signs, no guarding  Extremities: no deformities,  no edema   Neuro; follow commands, alert and oriented x3, spontaneous speech, coherent, moves all extremities spontaneously  Psych; no hallucination, euthymic mood, not agitated         Primary Care Physician   Natalya Matthews    Discharge Orders      Reason for your hospital stay    Kaycee bryant is a pleasant 83-year-old lady who independently lives with her spouse and a background history of prior pneumonia, mild asthma on inhalers who presented here with shortness of breath, coughing spells eventually found with community-acquired pneumonia with high suspicion to be bacterial in etiology.  She was treated with breathing treatments, oxygen support that was subsequently tapered and discontinued, corticosteroids and she demonstrated significant improvement and getting back to her baseline levels.  No significant events overnight.  Tolerating oral diet with no nausea or vomiting or diarrhea.  Plans for hospital discharge with continuation of tapering dose of corticosteroids and finishing course of antibiotics.  She has longstanding history of atrial fibrillation with reassuring echocardiogram done here.  Current weight reading acceptable ranges continued on her diltiazem and her chronic anticoagulation with warfarin     Follow-up and recommended labs and tests     Follow up with primary care provider, Natalya Matthews, within 7 days to evaluate medication change, to evaluate treatment change, and for hospital follow- up.  The following labs/tests are recommended: Repeat INR and adjust warfarin dosing accordingly.     Activity    Your activity upon discharge: activity as tolerated     Full Code     Diet    Follow this diet upon discharge: Orders Placed This Encounter      Combination Diet Regular Diet Adult       Significant Results and Procedures   Most Recent 3 CBC's:  Recent Labs   Lab Test 02/27/24  1035 02/26/24  0610 02/25/24  0936   WBC 15.0* 15.3* 11.3*   HGB 11.4* 11.3* 13.4    99 97    229 239      Most Recent 3 BMP's:  Recent Labs   Lab Test 24  0643 24  1035 24  0610 24  0936   NA  --  140 140 139   POTASSIUM 3.8 3.6 3.9 4.4   CHLORIDE  --  105 107 104   CO2  --     BUN  --  15.6 16.0 12.5   CR  --  0.86 0.85 0.91   ANIONGAP  --  8 7 10   MELISSA  --  9.0 9.0 8.9   GLC  --  107* 88 142*     Most Recent 2 LFT's:  Recent Labs   Lab Test 24  0936 10/05/23  2329   AST 37 17   ALT 13 9   ALKPHOS 60 66   BILITOTAL 0.5 0.5     Most Recent 3 INR's:  Recent Labs   Lab Test 24  0801 24  0643 24  1035   INR 1.89* 2.18* 2.65*     Most Recent 3 Troponin's:No lab results found.  Most Recent 3 BNP's:No lab results found.  Most Recent TSH and T4:No lab results found.  Most Recent Hemoglobin A1c:No lab results found.  Most Recent 6 glucoses:  Recent Labs   Lab Test 24  1035 24  0610 24  0936 10/06/23  1231 10/06/23  0828 10/05/23  2329   * 88 142* 115* 92 116*     Most Recent Urinalysis:No lab results found.,   Results for orders placed or performed during the hospital encounter of 24   XR Chest Port 1 View    Narrative    EXAM: XR CHEST PORTABLE 1 VIEW  LOCATION: [facility]  DATE: 2024    INDICATION: Dyspnea.  COMPARISON: 10/06/2023.      Impression    IMPRESSION: Increased opacity in the perihilar right upper lobe. Small right pleural effusion. Stable atelectasis or scarring at the lung bases. Cardiomegaly with mild pulmonary vascular congestion.     Echocardiogram Complete     Value    LVEF  60-65%    Narrative    [id number]  [id number]  [id number]  [id number]^ERWIN^AL^[facility]  Echocardiography Laboratory  [address] [place] [address]     Name: FRANCISCO J ARREAGA  MRN: [id number]  : 1940  Study Date: 2024 10:57 AM  Age: 83 yrs  Gender: Female  Patient Location: [facility]  Reason For Study: Atrial Fibrillation  Ordering Physician: JUVENTINO HOOD  ERIN  Referring Physician: Natalya Matthews NP  Performed By: Ahmet Hu RDCS     BSA: 2.0 m2  Height: 65 in  Weight: 199 lb  HR: 99  BP: 110/50 mmHg  ______________________________________________________________________________  Procedure  Complete Portable Echo Adult.  ______________________________________________________________________________  Interpretation Summary     The left ventricle is normal in size.  The visual ejection fraction is 60-65%.  No regional wall motion abnormalities noted.  No significant valvular heart disease.  The rhythm was atrial fibrillation.  ______________________________________________________________________________  Left Ventricle  The left ventricle is normal in size. There is normal left ventricular wall  thickness. Diastolic Doppler findings (E/E' ratio and/or other parameters)  suggest left ventricular filling pressures are indeterminate. The visual  ejection fraction is 60-65%. No regional wall motion abnormalities noted.     Right Ventricle  The right ventricle is normal in size and function.     Atria  Normal left atrial size. Right atrial size is normal. There is no color  Doppler evidence of an atrial shunt.     Mitral Valve  The mitral valve leaflets are mildly thickened. There is trace mitral  regurgitation.     Tricuspid Valve  There is trace tricuspid regurgitation. IVC diameter and respiratory changes  fall into an intermediate range suggesting an RA pressure of 8 mmHg.     Aortic Valve  There is trivial trileaflet aortic sclerosis. No aortic regurgitation is  present.     Pulmonic Valve  There is trace pulmonic valvular regurgitation.     Vessels  Normal size aorta. The aortic root is normal size.     Pericardium  There is no pericardial effusion.     Rhythm  The rhythm was atrial fibrillation.  ______________________________________________________________________________  MMode/2D Measurements & Calculations  IVSd: 0.91 cm     LVIDd: 4.2 cm  LVIDs: 2.9  cm  LVPWd: 0.97 cm  IVC diam: 2.3 cm  FS: 31.3 %  LV mass(C)d: 127.2 grams  LV mass(C)dI: 64.4 grams/m2  Ao root diam: 3.5 cm  LA dimension: 4.1 cm  asc Aorta Diam: 3.5 cm  LA/Ao: 1.1  Ao root diam index Ht(cm/m): 2.1  Ao root diam index BSA (cm/m2): 1.8  Asc Ao diam index BSA (cm/m2): 1.8  Asc Ao diam index Ht(cm/m): 2.1  LA Volume (BP): 55.3 ml     LA Volume Index (BP): 28.1 ml/m2  RWT: 0.46  TAPSE: 1.7 cm     Doppler Measurements & Calculations  MV E max sarthak: 155.0 cm/sec  MV dec slope: 761.7 cm/sec2  MV dec time: 0.20 sec  Ao V2 max: 190.4 cm/sec  Ao max P.6 mmHg  TR max sarthak: 283.9 cm/sec  TR max P.9 mmHg  E/E' av.1  Lateral E/e': 12.3  Medial E/e': 14.0  RV S Sarthak: 17.3 cm/sec     ______________________________________________________________________________  Report approved by: Christi Jerry 2024 12:12 PM             Discharge Medications   Current Discharge Medication List        START taking these medications    Details   amoxicillin-clavulanate (AUGMENTIN) 875-125 MG tablet Take 1 tablet by mouth 2 times daily for 4 days  Qty: 8 tablet, Refills: 0    Associated Diagnoses: Pneumonia of right lung due to infectious organism, unspecified part of lung      predniSONE (DELTASONE) 20 MG tablet Take 2 tablets (40 mg) by mouth daily for 1 day, THEN 1 tablet (20 mg) daily for 3 days, THEN 0.5 tablets (10 mg) daily for 3 days.  Qty: 7 tablet, Refills: 0    Associated Diagnoses: Pneumonia of right lung due to infectious organism, unspecified part of lung; Asthma with acute exacerbation, unspecified asthma severity, unspecified whether persistent           CONTINUE these medications which have NOT CHANGED    Details   amitriptyline (ELAVIL) 10 MG tablet Take 30 mg by mouth every morning      diltiazem ER (DILT-XR) 180 MG 24 hr capsule Take 180 mg by mouth daily      FLONASE 50 MCG/ACT NA SUSP Spray 1 spray into both nostrils 2 times daily    Associated Diagnoses: Tension headaches     "  fluconazole (DIFLUCAN) 100 MG tablet Take 100 mg by mouth three times a week Sun, Wed, Fri      fluticasone-salmeterol (ADVAIR) 250-50 MCG/ACT inhaler Inhale 1 puff into the lungs every 12 hours      gabapentin (NEURONTIN) 600 MG tablet Take 1,200 mg by mouth 3 times daily      montelukast (SINGULAIR) 10 MG tablet Take 10 mg by mouth at bedtime      omeprazole (PRILOSEC OTC) 20 MG EC tablet Take 20 mg by mouth daily      simvastatin (ZOCOR) 10 MG tablet Take 10 mg by mouth at bedtime      warfarin ANTICOAGULANT (COUMADIN) 5 MG tablet Take 2.5-5 mg by mouth daily 5mg MWF, 2.5mg rest of the week      ZYRTEC ALLERGY PO Take 10 mg by mouth daily    Associated Diagnoses: Tension headaches           Allergies   Allergies   Allergen Reactions    Claritin [Loratadine]      Arthritic feeling in hands and feet    Codeine      \"Wide-awake\"    Morphine And Related     Zantac      "

## 2024-02-29 NOTE — PHARMACY-ANTICOAGULATION SERVICE
Clinical Pharmacy- Warfarin Discharge Note  This patient is currently on warfarin for the treatment of Atrial fibrillation.  INR Goal= 2-3  Expected length of therapy lifetime.    Warfarin PTA Regimen: 5mg=MWF, 2.5mg=ROW      Anticoagulation Dose History  More data exists         Latest Ref Rng & Units 10/8/2023 10/12/2023 2/25/2024 2/26/2024 2/27/2024 2/28/2024 2/29/2024   Recent Dosing and Labs   warfarin ANTICOAGULANT (COUMADIN) 0.5 mg TABS half-tab - - - - 0.5 mg, $Given - - -   warfarin ANTICOAGULANT (COUMADIN) 2.5 MG tablet - - - 2.5 mg, $Given - 2.5 mg, $Given 2.5 mg, $Given -   INR 0.85 - 1.15 2.35  2.3  2.73  2.71  3.49        Result confirmed by repeated test. 2.65  2.18  1.89        Vitamin K doses administered during the last 7 days: none  FFP administered during the last 7 days: none    Agree with discharging the patient on a warfarin regimen of 3 mg daily given DDI with Augmentin and prednisone that can increase the INR. INR has trended down after 2.5mg x2d which indicates 2.5mg is not enough.     The patient should have an INR checked  on Monday, 3/4/24 .

## 2024-02-29 NOTE — PLAN OF CARE
Goal Outcome Evaluation:             Discharge instruction given to patient, expressed understanding. Pt.discharge with her .

## 2024-02-29 NOTE — PLAN OF CARE
"Goal Outcome Evaluation:      Plan of Care Reviewed With: patient    Overall Patient Progress: no change     Temp: 98.5  F (36.9  C) Temp src: Oral BP: 130/70 Pulse: 79   Resp: 16 SpO2: 94 % O2 Device: Nasal cannula Oxygen Delivery: 1/2 LPM     Pt A/O x4, VSS, afebrile. Denies pain, c/p, dizziness, n/v. LS w/ fine crackles and insp exp wheezes, ZALDIVAR, 92-94% on 1/2 L O2 NC. On K+ protocol, AM recheck. No BM for 2 days, prune juice given. Continue IV Rocephin and PO Zithromax for PNA. On nebs and inhaler. Up independently in room. Discharge meds in pt's cubby in meds room. Continue to monitor.    Problem: Adult Inpatient Plan of Care  Goal: Plan of Care Review  Description: The Plan of Care Review/Shift note should be completed every shift.  The Outcome Evaluation is a brief statement about your assessment that the patient is improving, declining, or no change.  This information will be displayed automatically on your shift  note.  Outcome: Progressing  Flowsheets (Taken 2/28/2024 230)  Plan of Care Reviewed With: patient  Overall Patient Progress: no change  Goal: Patient-Specific Goal (Individualized)  Description: You can add care plan individualizations to a care plan. Examples of Individualization might be:  \"Parent requests to be called daily at 9am for status\", \"I have a hard time hearing out of my right ear\", or \"Do not touch me to wake me up as it startles  me\".  Outcome: Progressing  Goal: Absence of Hospital-Acquired Illness or Injury  Outcome: Progressing  Intervention: Identify and Manage Fall Risk  Recent Flowsheet Documentation  Taken 2/28/2024 1720 by Sherlyn Avelar RN  Safety Promotion/Fall Prevention:   safety round/check completed   room organization consistent   clutter free environment maintained   nonskid shoes/slippers when out of bed  Intervention: Prevent Skin Injury  Recent Flowsheet Documentation  Taken 2/28/2024 1720 by Sherlyn Avelar RN  Body Position: position changed " independently  Intervention: Prevent and Manage VTE (Venous Thromboembolism) Risk  Recent Flowsheet Documentation  Taken 2/28/2024 1720 by Sherlyn Avelar RN  VTE Prevention/Management: SCDs (sequential compression devices) off  Intervention: Prevent Infection  Recent Flowsheet Documentation  Taken 2/28/2024 1720 by Sherlyn Avelar RN  Infection Prevention:   hand hygiene promoted   equipment surfaces disinfected  Goal: Optimal Comfort and Wellbeing  Outcome: Progressing  Goal: Readiness for Transition of Care  Outcome: Progressing     Problem: Dysrhythmia  Goal: Normalized Cardiac Rhythm  Outcome: Progressing  Intervention: Monitor and Manage Cardiac Rhythm Effect  Recent Flowsheet Documentation  Taken 2/28/2024 1720 by Sherlyn Avelar RN  VTE Prevention/Management: SCDs (sequential compression devices) off

## 2024-03-01 ENCOUNTER — PATIENT OUTREACH (OUTPATIENT)
Dept: CARE COORDINATION | Facility: CLINIC | Age: 84
End: 2024-03-01
Payer: MEDICARE

## 2024-03-01 LAB
BACTERIA BLD CULT: NO GROWTH
BACTERIA BLD CULT: NO GROWTH

## 2024-03-01 NOTE — PROGRESS NOTES
"  [facility]: [facility]: Post-Discharge Note  SITUATION                                                      Admission:    Admission Date: 02/25/24   Reason for Admission: SOB  Discharge:   Discharge Date: 02/29/24  Discharge Diagnosis: Acute hypoxic respiratory failure    BACKGROUND                                                      Per hospital discharge summary and inpatient provider notes:Continuing service care for this pleasant lady.  Her planned discharge last February 28 was deferred as she was still requiring oxygen support at that time and not feeling too well and having some shortness of breath.  Overnight she did well with no issues of any tachyarrhythmias.  Successfully weaned off from oxygen.  Feeling significantly improved.  Slept very well overnight.  Tolerating oral diet.  She is requesting for hospital discharge as well.  And will proceed with that today.  No changes with her prescription medication which she will be finishing course of her antibiotics and tapering dose of steroids.         ASSESSMENT           Discharge Assessment  How are you doing now that you are home?: \" I am doing well, I have all my medications \"  How are your symptoms? (Red Flag symptoms escalate to triage hotline per guidelines): Improved  Do you feel your condition is stable enough to be safe at home until your provider visit?: Yes  Does the patient have their discharge instructions? : Yes  Does the patient have questions regarding their discharge instructions? : No  Were you started on any new medications or were there changes to any of your previous medications? : Yes  Does the patient have all of their medications?: Yes  Do you have questions regarding any of your medications? : No  Do you have all of your needed medical supplies or equipment (DME)?  (i.e. oxygen tank, CPAP, cane, etc.): Yes  Discharge follow-up appointment scheduled within 14 calendar days? : No    Post-op ([facility] CTA " Only)  If the patient had a surgery or procedure, do they have any questions for a nurse?: No           PLAN                                                      Outpatient Plan: Follow-up Appointments     Follow-up and recommended labs and tests       Follow up with primary care provider, Natalya Matthews, within 7 days to   evaluate medication change, to evaluate treatment change, and for hospital   follow- up.  The following labs/tests are recommended: Repeat INR and   adjust warfarin dosing accordingly.         No future appointments.      For any urgent concerns, please contact our 24 hour nurse triage line: [phone] ([phone])         VIRI Sethi

## 2024-03-15 ENCOUNTER — LAB (OUTPATIENT)
Dept: LAB | Facility: CLINIC | Age: 84
End: 2024-03-15
Payer: MEDICARE

## 2024-03-15 DIAGNOSIS — I48.91 ATRIAL FIBRILLATION (H): ICD-10-CM

## 2024-03-15 LAB — INR BLD: 3.3 (ref 0.9–1.1)

## 2024-03-15 PROCEDURE — 36416 COLLJ CAPILLARY BLOOD SPEC: CPT

## 2024-03-15 PROCEDURE — 85610 PROTHROMBIN TIME: CPT

## 2024-04-08 ENCOUNTER — LAB (OUTPATIENT)
Dept: LAB | Facility: CLINIC | Age: 84
End: 2024-04-08
Payer: MEDICARE

## 2024-04-08 DIAGNOSIS — I48.91 ATRIAL FIBRILLATION (H): ICD-10-CM

## 2024-04-08 LAB — INR BLD: 2.7 (ref 0.9–1.1)

## 2024-04-08 PROCEDURE — 36416 COLLJ CAPILLARY BLOOD SPEC: CPT

## 2024-04-08 PROCEDURE — 85610 PROTHROMBIN TIME: CPT

## 2024-04-23 ENCOUNTER — LAB (OUTPATIENT)
Dept: LAB | Facility: CLINIC | Age: 84
End: 2024-04-23
Payer: MEDICARE

## 2024-04-23 DIAGNOSIS — I48.91 ATRIAL FIBRILLATION (H): ICD-10-CM

## 2024-04-23 LAB — INR BLD: 1.7 (ref 0.9–1.1)

## 2024-04-23 PROCEDURE — 85610 PROTHROMBIN TIME: CPT

## 2024-04-23 PROCEDURE — 36416 COLLJ CAPILLARY BLOOD SPEC: CPT

## 2024-06-03 ENCOUNTER — LAB (OUTPATIENT)
Dept: LAB | Facility: CLINIC | Age: 84
End: 2024-06-03
Payer: MEDICARE

## 2024-06-03 DIAGNOSIS — I48.91 ATRIAL FIBRILLATION (H): ICD-10-CM

## 2024-06-03 LAB — INR BLD: 1.6 (ref 0.9–1.1)

## 2024-06-03 PROCEDURE — 85610 PROTHROMBIN TIME: CPT

## 2024-06-03 PROCEDURE — 36416 COLLJ CAPILLARY BLOOD SPEC: CPT

## 2024-06-04 DIAGNOSIS — I48.91 ATRIAL FIBRILLATION WITH RVR (H): Primary | ICD-10-CM

## 2024-07-12 ENCOUNTER — LAB (OUTPATIENT)
Dept: LAB | Facility: CLINIC | Age: 84
End: 2024-07-12
Payer: MEDICARE

## 2024-07-12 DIAGNOSIS — I48.91 ATRIAL FIBRILLATION WITH RVR (H): ICD-10-CM

## 2024-07-12 LAB — INR BLD: 2.8 (ref 0.9–1.1)

## 2024-07-12 PROCEDURE — 85610 PROTHROMBIN TIME: CPT

## 2024-07-12 PROCEDURE — 36416 COLLJ CAPILLARY BLOOD SPEC: CPT

## 2024-07-22 ENCOUNTER — LAB (OUTPATIENT)
Dept: LAB | Facility: CLINIC | Age: 84
End: 2024-07-22
Payer: MEDICARE

## 2024-07-22 DIAGNOSIS — I48.91 ATRIAL FIBRILLATION WITH RVR (H): ICD-10-CM

## 2024-07-22 LAB — INR BLD: 2.1 (ref 0.9–1.1)

## 2024-07-22 PROCEDURE — 36416 COLLJ CAPILLARY BLOOD SPEC: CPT

## 2024-07-22 PROCEDURE — 85610 PROTHROMBIN TIME: CPT

## 2024-08-12 ENCOUNTER — LAB (OUTPATIENT)
Dept: LAB | Facility: CLINIC | Age: 84
End: 2024-08-12
Payer: MEDICARE

## 2024-08-12 DIAGNOSIS — I48.91 ATRIAL FIBRILLATION WITH RVR (H): ICD-10-CM

## 2024-08-12 LAB — INR BLD: 2.4 (ref 0.9–1.1)

## 2024-08-12 PROCEDURE — 36416 COLLJ CAPILLARY BLOOD SPEC: CPT

## 2024-08-12 PROCEDURE — 85610 PROTHROMBIN TIME: CPT

## 2024-08-30 ENCOUNTER — LAB (OUTPATIENT)
Dept: LAB | Facility: CLINIC | Age: 84
End: 2024-08-30
Payer: MEDICARE

## 2024-08-30 DIAGNOSIS — I48.91 ATRIAL FIBRILLATION WITH RVR (H): ICD-10-CM

## 2024-08-30 LAB — INR BLD: 1.6 (ref 0.9–1.1)

## 2024-08-30 PROCEDURE — 85610 PROTHROMBIN TIME: CPT

## 2024-08-30 PROCEDURE — 36416 COLLJ CAPILLARY BLOOD SPEC: CPT

## 2024-09-20 ENCOUNTER — LAB (OUTPATIENT)
Dept: LAB | Facility: CLINIC | Age: 84
End: 2024-09-20
Payer: MEDICARE

## 2024-09-20 DIAGNOSIS — I48.91 ATRIAL FIBRILLATION WITH RVR (H): ICD-10-CM

## 2024-09-20 LAB — INR BLD: 1.1 (ref 0.9–1.1)

## 2024-09-20 PROCEDURE — 85610 PROTHROMBIN TIME: CPT

## 2024-09-20 PROCEDURE — 36416 COLLJ CAPILLARY BLOOD SPEC: CPT

## 2024-09-25 ENCOUNTER — LAB (OUTPATIENT)
Dept: LAB | Facility: CLINIC | Age: 84
End: 2024-09-25
Payer: MEDICARE

## 2024-09-25 DIAGNOSIS — I48.91 ATRIAL FIBRILLATION WITH RVR (H): ICD-10-CM

## 2024-09-25 LAB — INR BLD: 1.2 (ref 0.9–1.1)

## 2024-09-25 PROCEDURE — 36416 COLLJ CAPILLARY BLOOD SPEC: CPT

## 2024-09-25 PROCEDURE — 85610 PROTHROMBIN TIME: CPT

## 2024-09-27 ENCOUNTER — LAB (OUTPATIENT)
Dept: LAB | Facility: CLINIC | Age: 84
End: 2024-09-27
Payer: MEDICARE

## 2024-09-27 DIAGNOSIS — I48.91 ATRIAL FIBRILLATION WITH RVR (H): ICD-10-CM

## 2024-09-27 LAB — INR BLD: 1.2 (ref 0.9–1.1)

## 2024-09-27 PROCEDURE — 36416 COLLJ CAPILLARY BLOOD SPEC: CPT

## 2024-09-27 PROCEDURE — 85610 PROTHROMBIN TIME: CPT

## 2024-10-03 ENCOUNTER — LAB (OUTPATIENT)
Dept: LAB | Facility: CLINIC | Age: 84
End: 2024-10-03
Payer: MEDICARE

## 2024-10-03 DIAGNOSIS — I48.91 ATRIAL FIBRILLATION WITH RVR (H): ICD-10-CM

## 2024-10-03 LAB — INR BLD: 1.1 (ref 0.9–1.1)

## 2024-10-03 PROCEDURE — 36416 COLLJ CAPILLARY BLOOD SPEC: CPT

## 2024-10-03 PROCEDURE — 85610 PROTHROMBIN TIME: CPT

## 2024-10-09 ENCOUNTER — LAB (OUTPATIENT)
Dept: LAB | Facility: CLINIC | Age: 84
End: 2024-10-09
Payer: MEDICARE

## 2024-10-09 DIAGNOSIS — I48.91 ATRIAL FIBRILLATION WITH RVR (H): ICD-10-CM

## 2024-10-09 LAB — INR BLD: 1.8 (ref 0.9–1.1)

## 2024-10-09 PROCEDURE — 36416 COLLJ CAPILLARY BLOOD SPEC: CPT

## 2024-10-09 PROCEDURE — 85610 PROTHROMBIN TIME: CPT

## 2024-10-15 ENCOUNTER — LAB (OUTPATIENT)
Dept: LAB | Facility: CLINIC | Age: 84
End: 2024-10-15
Payer: MEDICARE

## 2024-10-15 DIAGNOSIS — I48.91 ATRIAL FIBRILLATION WITH RVR (H): ICD-10-CM

## 2024-10-15 LAB — INR BLD: 2.9 (ref 0.9–1.1)

## 2024-10-15 PROCEDURE — 85610 PROTHROMBIN TIME: CPT

## 2024-10-15 PROCEDURE — 36416 COLLJ CAPILLARY BLOOD SPEC: CPT

## 2024-10-23 ENCOUNTER — LAB (OUTPATIENT)
Dept: LAB | Facility: CLINIC | Age: 84
End: 2024-10-23
Payer: MEDICARE

## 2024-10-23 DIAGNOSIS — I48.91 ATRIAL FIBRILLATION WITH RVR (H): ICD-10-CM

## 2024-10-23 LAB — INR BLD: 3.4 (ref 0.9–1.1)

## 2024-10-23 PROCEDURE — 85610 PROTHROMBIN TIME: CPT

## 2024-10-23 PROCEDURE — 36416 COLLJ CAPILLARY BLOOD SPEC: CPT

## 2024-11-01 ENCOUNTER — LAB (OUTPATIENT)
Dept: LAB | Facility: CLINIC | Age: 84
End: 2024-11-01
Payer: MEDICARE

## 2024-11-01 DIAGNOSIS — I48.91 ATRIAL FIBRILLATION WITH RVR (H): ICD-10-CM

## 2024-11-01 LAB — INR BLD: 3.2 (ref 0.9–1.1)

## 2024-11-01 PROCEDURE — 36416 COLLJ CAPILLARY BLOOD SPEC: CPT

## 2024-11-01 PROCEDURE — 85610 PROTHROMBIN TIME: CPT

## 2024-11-08 ENCOUNTER — HOSPITAL ENCOUNTER (EMERGENCY)
Facility: CLINIC | Age: 84
Discharge: HOME | End: 2024-11-08
Admitting: EMERGENCY MEDICINE
Payer: MEDICARE

## 2024-11-08 VITALS
RESPIRATION RATE: 16 BRPM | DIASTOLIC BLOOD PRESSURE: 76 MMHG | SYSTOLIC BLOOD PRESSURE: 134 MMHG | HEART RATE: 66 BPM | OXYGEN SATURATION: 98 % | TEMPERATURE: 97.8 F

## 2024-11-08 PROCEDURE — 99281 EMR DPT VST MAYX REQ PHY/QHP: CPT

## 2024-11-08 NOTE — ED NOTES
Bed: ED22  Expected date:   Expected time:   Means of arrival:   Comments:  E1  89 F fall/arm and leg pain  1300

## 2024-11-21 ENCOUNTER — LAB (OUTPATIENT)
Dept: LAB | Facility: CLINIC | Age: 84
End: 2024-11-21
Payer: MEDICARE

## 2024-11-21 DIAGNOSIS — I48.91 ATRIAL FIBRILLATION WITH RVR (H): ICD-10-CM

## 2024-11-21 LAB — INR BLD: 1.3 (ref 0.9–1.1)

## 2024-12-23 ENCOUNTER — LAB (OUTPATIENT)
Dept: LAB | Facility: CLINIC | Age: 84
End: 2024-12-23
Payer: MEDICARE

## 2024-12-23 DIAGNOSIS — I48.91 ATRIAL FIBRILLATION WITH RVR (H): ICD-10-CM

## 2024-12-23 LAB — INR BLD: 5.8 (ref 0.9–1.1)

## 2024-12-23 PROCEDURE — 36416 COLLJ CAPILLARY BLOOD SPEC: CPT

## 2024-12-23 PROCEDURE — 85610 PROTHROMBIN TIME: CPT

## 2025-01-13 ENCOUNTER — LAB (OUTPATIENT)
Dept: LAB | Facility: CLINIC | Age: 85
End: 2025-01-13
Payer: MEDICARE

## 2025-01-13 DIAGNOSIS — I48.91 ATRIAL FIBRILLATION WITH RVR (H): ICD-10-CM

## 2025-01-13 LAB — INR BLD: 2.2 (ref 0.9–1.1)

## 2025-01-13 PROCEDURE — 85610 PROTHROMBIN TIME: CPT

## 2025-01-13 PROCEDURE — 36416 COLLJ CAPILLARY BLOOD SPEC: CPT

## 2025-01-30 ENCOUNTER — LAB (OUTPATIENT)
Dept: LAB | Facility: CLINIC | Age: 85
End: 2025-01-30
Payer: MEDICARE

## 2025-01-30 DIAGNOSIS — I48.91 ATRIAL FIBRILLATION WITH RVR (H): ICD-10-CM

## 2025-01-30 LAB — INR BLD: 2.4 (ref 0.9–1.1)

## 2025-02-20 ENCOUNTER — LAB (OUTPATIENT)
Dept: LAB | Facility: CLINIC | Age: 85
End: 2025-02-20
Payer: MEDICARE

## 2025-02-20 DIAGNOSIS — I48.91 ATRIAL FIBRILLATION WITH RVR (H): ICD-10-CM

## 2025-02-20 LAB — INR BLD: 1.9 (ref 0.9–1.1)

## 2025-03-13 ENCOUNTER — LAB (OUTPATIENT)
Dept: LAB | Facility: CLINIC | Age: 85
End: 2025-03-13
Payer: MEDICARE

## 2025-03-13 DIAGNOSIS — I48.91 ATRIAL FIBRILLATION WITH RVR (H): ICD-10-CM

## 2025-03-13 LAB — INR BLD: 2.7 (ref 0.9–1.1)

## 2025-05-19 ENCOUNTER — LAB (OUTPATIENT)
Dept: LAB | Facility: CLINIC | Age: 85
End: 2025-05-19
Payer: MEDICARE

## 2025-05-19 DIAGNOSIS — I48.91 ATRIAL FIBRILLATION WITH RVR (H): ICD-10-CM

## 2025-05-19 LAB — INR BLD: 1.3 (ref 0.9–1.1)

## 2025-05-19 PROCEDURE — 36416 COLLJ CAPILLARY BLOOD SPEC: CPT

## 2025-05-19 PROCEDURE — 85610 PROTHROMBIN TIME: CPT

## 2025-05-26 NOTE — PROGRESS NOTES
I have reviewed discharge instructions with the patient.  Opportunity for questions and clarification was provided.  The patient verbalized understanding.  Patient discharged out of the ED via ambulatory with no difficulty and in stable condition.     [facility]    Medicine Progress Note - Hospitalist Service    Date of Admission:  2/25/2024    Assessment & Plan   Kaycee Fuchs is a 83 year old female patient with past medical history of asthma, atrial fibrillation, hyperlipidemia, GERD, history of recurrent respiratory tract infections, came to emergency room for evaluation for cough, shortness of breath and wheezing.  She states that she has been having nasal congestion for the past few days.  This morning she woke up with severe cough associated with fever and chills.  She also states that she had shortness of breath and wheezing.  He denies chest pain.  She also denies leg swelling.  Because of worsening of her symptoms she came to emergency room for evaluation.  On arrival to emergency room, her vital signs were checked and showed temperature 102.2, pulse 161, blood pressure 131/79, oxygen saturation 93% on 10 L.  Lab workup showed normal BMP, lactic acid 1.3.  Venous gas within normal range.  WBC 11.3, hemoglobin 13.4.  Chest x-ray showed increased opacity in the perihilar right upper lobe, small right pleural effusion.  He has cardiomegaly with mild pulmonary vascular congestion.  EKG showed atrial fibrillation with rapid ventricular response.   In emergency room, she was given nebs, IV ceftriaxone and azithromycin.  She was given prednisone 40 mg p.o. cardiology was consulted from emergency room for A-fib with RVR as her blood pressure is running on the low side. Cardiology recommended digoxin 250 mcg IV to be repeated in an hour.  Patient received 2 doses of digoxin 250 mcg IV in the emergency room.  She was admitted to the hospital for further management.    Acute hypoxic respiratory failure   Sepsis with presenting fever, tachycardia, leukocytosis with hypoxia secondary to underlying suspected bacterial right-sided community-acquired pneumonia  Asthma exacerbation with history of mild intermittent bronchial asthma    -Patient  states that she had nasal congestion for few days.  She developed a sudden shortness of breath, cough, fever and chills this morning.  She states that she had wheezes.  -In the ER, she was noted to be febrile and tachycardic.  She was also hypoxic.  She was put on oxygen supplement.  Lab workup showed mild leukocytosis at 11.3.  Lactic acid 1.3.    -Chest x-ray revealed evidence of right upper lobe perihilar opacity and small right pleural effusion.  -Continue to antibiotics currently on ceftriaxone and Zithromax.  Will put her on Xopenex nebs given her A-fib with RVR  -She was given prednisone 40 mg p.o. in the ER.  Will continue prednisone 40 mg daily.  -Resume Singulair, Advair  -Also resume her candidiasis prophylaxis of Diflucan that she takes ever since she has been placed on Advair    Atrial fibrillation with rapid ventricular response  -likely triggered by pneumonia and asthma exacerbation.  Anticoagulated with warfarin.  She is on diltiazem  mg daily PTA.  EKG showed A-fib with RVR.  Cardiology was consulted from ER and recommended digoxin IV given her low blood pressure.  She received digoxin 250 mcg IV x 2 doses.  -Will continue to monitor on telemetry  -Will resume diltiazem  mg daily with hold parameter  -Current telemonitoring showing VVR with no recurrent RVR  -Check echocardiogram for completion  -Remain on cardiac telemonitoring  -INR therapeutic at 2.71.  Will consult pharmacy for Coumadin dosing    Hyperlipidemia  Will resume simvastatin    GERD  Remain on PPI-            Diet: Combination Diet Regular Diet Adult    DVT Prophylaxis: Warfarin  Shearer Catheter: Not present  Lines: None     Cardiac Monitoring: ACTIVE order. Indication: Tachyarrhythmias, acute (48 hours)  Code Status: Full Code      Clinically Significant Risk Factors Present on Admission               # Drug Induced Coagulation Defect: home medication list includes an anticoagulant medication         # Obesity: Estimated  "body mass index is 33.18 kg/m  as calculated from the following:    Height as of this encounter: 1.651 m (5' 5\").    Weight as of this encounter: 90.4 kg (199 lb 6.4 oz).       # Asthma: noted on problem list        Disposition Plan     Expected Discharge Date:               Anticipating she will be requiring at least 2 more inpatient days      Yonathan Bernardo MD, MD  Hospitalist Service  [facility]  Securely message with [employer] (more info)  Text page via [facility] Paging/Directory   ______________________________________________________________________    Interval History   I assumed medicine service care today.  Seen and examined.  Chart reviewed.  I met this very pleasant lady while she is laying comfortably in bed.  Kaycee appears to be in better spirits as she is feeling significantly improved from yesterday's condition.  She mentioned that she was able to sleep for couple of hours.  Her shortness of breath has improved.  Earlier wheezing has also been improving.  Currently able to tolerate oral diet with no nausea or vomiting.  No mental status changes.  She endorses no chest pain, abdominal pain.  No reported diarrhea as well.    Physical Exam   Vital Signs: Temp: 97.5  F (36.4  C) Temp src: Oral BP: 110/50 Pulse: 103   Resp: 20 SpO2: 93 % O2 Device: Nasal cannula Oxygen Delivery: 2 LPM  Weight: 199 lbs 6.4 oz    HEENT; Atraumatic, normocephalic, pinkish conjuctiva, pupils bilateral reactive   Skin: warm and moist, no rashes  Lymphatics: no cervical or axillary lymphandenopathy  Lungs: Fair air entry, scant wheezes, no crackles  Heart: normal rate, normal rhythm, no rubs or gallops.   Abdomen: normal bowel sounds, no tenderness, no peritoneal signs, no guarding  Extremities: no deformities, chronic hyperpigmented skin lower extremities, bilateral nonpitting lower extremity edema   Neuro; follow commands, alert and oriented x3, spontaneous speech, coherent, moves all extremities " spontaneously  Psych; no hallucination, euthymic mood, not agitated      Medical Decision Making       50 MINUTES SPENT BY ME on the date of service doing chart review, history, exam, documentation & further activities per the note.  MANAGEMENT DISCUSSED with the following over the past 24 hours: Yes   NOTE(S)/MEDICAL RECORDS REVIEWED over the past 24 hours: Yes       Data     I have personally reviewed the following data over the past 24 hrs:    15.3 (H)  \   11.3 (L)   / 229     140 107 16.0 /  88   3.9 26 0.85 \     ALT: 13 AST: 37 AP: 60 TBILI: 0.5   ALB: 4.0 TOT PROTEIN: 6.9 LIPASE: N/A     Procal: N/A CRP: N/A Lactic Acid: 1.0       INR:  3.49 (H) PTT:  N/A   D-dimer:  N/A Fibrinogen:  N/A       Imaging results reviewed over the past 24 hrs:   Recent Results (from the past 24 hour(s))   XR Chest Port 1 View    Narrative    EXAM: XR CHEST PORTABLE 1 VIEW  LOCATION: [facility]  DATE: 02/25/2024    INDICATION: Dyspnea.  COMPARISON: 10/06/2023.      Impression    IMPRESSION: Increased opacity in the perihilar right upper lobe. Small right pleural effusion. Stable atelectasis or scarring at the lung bases. Cardiomegaly with mild pulmonary vascular congestion.

## 2025-05-28 ENCOUNTER — LAB (OUTPATIENT)
Dept: LAB | Facility: CLINIC | Age: 85
End: 2025-05-28
Payer: MEDICARE

## 2025-05-28 DIAGNOSIS — I48.91 ATRIAL FIBRILLATION WITH RVR (H): ICD-10-CM

## 2025-05-28 LAB — INR BLD: 1.6 (ref 0.9–1.1)

## 2025-05-28 PROCEDURE — 85610 PROTHROMBIN TIME: CPT

## 2025-05-28 PROCEDURE — 36416 COLLJ CAPILLARY BLOOD SPEC: CPT

## 2025-06-05 ENCOUNTER — LAB (OUTPATIENT)
Dept: LAB | Facility: CLINIC | Age: 85
End: 2025-06-05
Payer: MEDICARE

## 2025-06-05 DIAGNOSIS — I48.91 ATRIAL FIBRILLATION WITH RVR (H): ICD-10-CM

## 2025-06-05 LAB — INR BLD: 1.8 (ref 0.9–1.1)

## 2025-06-19 ENCOUNTER — LAB (OUTPATIENT)
Dept: LAB | Facility: CLINIC | Age: 85
End: 2025-06-19
Payer: MEDICARE

## 2025-06-19 DIAGNOSIS — I48.20 CHRONIC ATRIAL FIBRILLATION (H): Primary | ICD-10-CM

## 2025-06-19 LAB — INR BLD: 4.2 (ref 0.9–1.1)

## 2025-06-23 ENCOUNTER — LAB (OUTPATIENT)
Dept: LAB | Facility: CLINIC | Age: 85
End: 2025-06-23
Payer: MEDICARE

## 2025-06-23 DIAGNOSIS — I48.20 CHRONIC ATRIAL FIBRILLATION (H): ICD-10-CM

## 2025-06-23 LAB — INR BLD: 2.7 (ref 0.9–1.1)

## 2025-06-23 PROCEDURE — 36416 COLLJ CAPILLARY BLOOD SPEC: CPT

## 2025-06-23 PROCEDURE — 85610 PROTHROMBIN TIME: CPT

## 2025-07-07 ENCOUNTER — LAB (OUTPATIENT)
Dept: LAB | Facility: CLINIC | Age: 85
End: 2025-07-07
Payer: MEDICARE

## 2025-07-07 DIAGNOSIS — I48.20 CHRONIC ATRIAL FIBRILLATION (H): ICD-10-CM

## 2025-07-07 LAB — INR BLD: 3.9 (ref 0.9–1.1)

## 2025-07-07 PROCEDURE — 85610 PROTHROMBIN TIME: CPT

## 2025-07-07 PROCEDURE — 36415 COLL VENOUS BLD VENIPUNCTURE: CPT

## 2025-07-21 ENCOUNTER — LAB (OUTPATIENT)
Dept: LAB | Facility: CLINIC | Age: 85
End: 2025-07-21
Payer: MEDICARE

## 2025-07-21 DIAGNOSIS — I48.20 CHRONIC ATRIAL FIBRILLATION (H): ICD-10-CM

## 2025-07-21 LAB — INR BLD: 4.4 (ref 0.9–1.1)

## 2025-07-21 PROCEDURE — 85610 PROTHROMBIN TIME: CPT

## 2025-07-21 PROCEDURE — 36416 COLLJ CAPILLARY BLOOD SPEC: CPT

## 2025-07-28 ENCOUNTER — LAB (OUTPATIENT)
Dept: LAB | Facility: CLINIC | Age: 85
End: 2025-07-28
Payer: MEDICARE

## 2025-07-28 DIAGNOSIS — I48.20 CHRONIC ATRIAL FIBRILLATION (H): ICD-10-CM

## 2025-07-28 LAB — INR BLD: 3 (ref 0.9–1.1)

## 2025-07-28 PROCEDURE — 36416 COLLJ CAPILLARY BLOOD SPEC: CPT

## 2025-07-28 PROCEDURE — 85610 PROTHROMBIN TIME: CPT

## 2025-08-11 ENCOUNTER — LAB (OUTPATIENT)
Dept: LAB | Facility: CLINIC | Age: 85
End: 2025-08-11
Payer: MEDICARE

## 2025-08-11 DIAGNOSIS — I48.20 CHRONIC ATRIAL FIBRILLATION (H): ICD-10-CM

## 2025-08-11 LAB — INR BLD: 2 (ref 0.9–1.1)

## 2025-08-11 PROCEDURE — 36416 COLLJ CAPILLARY BLOOD SPEC: CPT

## 2025-08-11 PROCEDURE — 85610 PROTHROMBIN TIME: CPT

## 2025-09-04 ENCOUNTER — LAB (OUTPATIENT)
Dept: LAB | Facility: CLINIC | Age: 85
End: 2025-09-04
Payer: MEDICARE

## 2025-09-04 DIAGNOSIS — I48.20 CHRONIC ATRIAL FIBRILLATION (H): ICD-10-CM

## 2025-09-04 LAB — INR BLD: 1.8 (ref 0.9–1.1)
